# Patient Record
Sex: FEMALE | Race: WHITE | NOT HISPANIC OR LATINO | Employment: OTHER | URBAN - METROPOLITAN AREA
[De-identification: names, ages, dates, MRNs, and addresses within clinical notes are randomized per-mention and may not be internally consistent; named-entity substitution may affect disease eponyms.]

---

## 2017-05-03 ENCOUNTER — ALLSCRIPTS OFFICE VISIT (OUTPATIENT)
Dept: OTHER | Facility: OTHER | Age: 67
End: 2017-05-03

## 2017-07-07 ENCOUNTER — APPOINTMENT (EMERGENCY)
Dept: RADIOLOGY | Facility: HOSPITAL | Age: 67
End: 2017-07-07
Payer: COMMERCIAL

## 2017-07-07 ENCOUNTER — HOSPITAL ENCOUNTER (EMERGENCY)
Facility: HOSPITAL | Age: 67
Discharge: HOME | End: 2017-07-07
Attending: EMERGENCY MEDICINE | Admitting: EMERGENCY MEDICINE
Payer: COMMERCIAL

## 2017-07-07 ENCOUNTER — APPOINTMENT (EMERGENCY)
Dept: CT IMAGING | Facility: HOSPITAL | Age: 67
End: 2017-07-07
Payer: COMMERCIAL

## 2017-07-07 VITALS
SYSTOLIC BLOOD PRESSURE: 134 MMHG | RESPIRATION RATE: 17 BRPM | OXYGEN SATURATION: 97 % | TEMPERATURE: 98.1 F | BODY MASS INDEX: 24.75 KG/M2 | WEIGHT: 145 LBS | HEART RATE: 71 BPM | HEIGHT: 64 IN | DIASTOLIC BLOOD PRESSURE: 82 MMHG

## 2017-07-07 DIAGNOSIS — M54.2 NECK PAIN, ACUTE: ICD-10-CM
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
## 2017-07-07 LAB
ANION GAP SERPL CALCULATED.3IONS-SCNC: 7 MMOL/L (ref 4–13)
BASOPHILS # BLD AUTO: 0.07 THOUSANDS/ΜL (ref 0–0.1)
BASOPHILS NFR BLD AUTO: 1 % (ref 0–1)
BUN SERPL-MCNC: 14 MG/DL (ref 5–25)
CALCIUM SERPL-MCNC: 9.6 MG/DL (ref 8.3–10.1)
CHLORIDE SERPL-SCNC: 106 MMOL/L (ref 100–108)
CO2 SERPL-SCNC: 29 MMOL/L (ref 21–32)
CREAT SERPL-MCNC: 0.77 MG/DL (ref 0.6–1.3)
EOSINOPHIL # BLD AUTO: 0.28 THOUSAND/ΜL (ref 0–0.61)
EOSINOPHIL NFR BLD AUTO: 4 % (ref 0–6)
ERYTHROCYTE [DISTWIDTH] IN BLOOD BY AUTOMATED COUNT: 12.2 % (ref 11.6–15.1)
GFR SERPL CREATININE-BSD FRML MDRD: >60 ML/MIN/1.73SQ M
GLUCOSE SERPL-MCNC: 102 MG/DL (ref 65–140)
HCT VFR BLD AUTO: 41.2 % (ref 34.8–46.1)
HGB BLD-MCNC: 13.8 G/DL (ref 11.5–15.4)
LYMPHOCYTES # BLD AUTO: 1.74 THOUSANDS/ΜL (ref 0.6–4.47)
LYMPHOCYTES NFR BLD AUTO: 25 % (ref 14–44)
MCH RBC QN AUTO: 29.7 PG (ref 26.8–34.3)
MCHC RBC AUTO-ENTMCNC: 33.5 G/DL (ref 31.4–37.4)
MCV RBC AUTO: 89 FL (ref 82–98)
MONOCYTES # BLD AUTO: 0.53 THOUSAND/ΜL (ref 0.17–1.22)
MONOCYTES NFR BLD AUTO: 8 % (ref 4–12)
NEUTROPHILS # BLD AUTO: 4.26 THOUSANDS/ΜL (ref 1.85–7.62)
NEUTS SEG NFR BLD AUTO: 62 % (ref 43–75)
NRBC BLD AUTO-RTO: 0 /100 WBCS
PLATELET # BLD AUTO: 160 THOUSANDS/UL (ref 149–390)
PMV BLD AUTO: 12 FL (ref 8.9–12.7)
POTASSIUM SERPL-SCNC: 4.1 MMOL/L (ref 3.5–5.3)
RBC # BLD AUTO: 4.65 MILLION/UL (ref 3.81–5.12)
SODIUM SERPL-SCNC: 142 MMOL/L (ref 136–145)
WBC # BLD AUTO: 6.89 THOUSAND/UL (ref 4.31–10.16)

## 2017-07-07 PROCEDURE — 80048 BASIC METABOLIC PNL TOTAL CA: CPT | Performed by: EMERGENCY MEDICINE

## 2017-07-07 PROCEDURE — 73030 X-RAY EXAM OF SHOULDER: CPT

## 2017-07-07 PROCEDURE — 72125 CT NECK SPINE W/O DYE: CPT

## 2017-07-07 PROCEDURE — 36415 COLL VENOUS BLD VENIPUNCTURE: CPT | Performed by: EMERGENCY MEDICINE

## 2017-07-07 PROCEDURE — 99284 EMERGENCY DEPT VISIT MOD MDM: CPT

## 2017-07-07 PROCEDURE — 85025 COMPLETE CBC W/AUTO DIFF WBC: CPT | Performed by: EMERGENCY MEDICINE

## 2017-07-07 RX ORDER — LOSARTAN POTASSIUM 50 MG/1
TABLET ORAL
COMMUNITY
Start: 2016-12-19

## 2017-07-07 RX ORDER — ASPIRIN 81 MG/1
81 TABLET ORAL
COMMUNITY
Start: 2015-09-01

## 2017-07-07 RX ORDER — METHOCARBAMOL 500 MG/1
500 TABLET, FILM COATED ORAL 4 TIMES DAILY PRN
Qty: 20 TABLET | Refills: 0 | Status: SHIPPED | OUTPATIENT
Start: 2017-07-07 | End: 2019-05-07

## 2017-07-07 RX ORDER — CLONAZEPAM 1 MG/1
TABLET ORAL
COMMUNITY
Start: 2017-07-03

## 2017-07-07 RX ORDER — OMEPRAZOLE 20 MG/1
CAPSULE, DELAYED RELEASE ORAL
COMMUNITY
Start: 2016-09-13

## 2017-07-07 RX ORDER — ATORVASTATIN CALCIUM 40 MG/1
TABLET, FILM COATED ORAL
COMMUNITY
Start: 2016-11-18

## 2017-07-07 RX ORDER — NAPROXEN 500 MG/1
500 TABLET ORAL 2 TIMES DAILY WITH MEALS
Qty: 20 TABLET | Refills: 0 | Status: SHIPPED | OUTPATIENT
Start: 2017-07-07 | End: 2018-07-07

## 2017-07-07 RX ORDER — IBUPROFEN 400 MG/1
400 TABLET ORAL ONCE
Status: DISCONTINUED | OUTPATIENT
Start: 2017-07-07 | End: 2017-07-07 | Stop reason: HOSPADM

## 2017-07-07 RX ORDER — BUPROPION HYDROCHLORIDE 100 MG/1
TABLET ORAL
COMMUNITY
Start: 2016-06-13

## 2017-09-12 ENCOUNTER — APPOINTMENT (OUTPATIENT)
Dept: LAB | Facility: CLINIC | Age: 67
End: 2017-09-12
Payer: COMMERCIAL

## 2017-09-12 ENCOUNTER — ALLSCRIPTS OFFICE VISIT (OUTPATIENT)
Dept: OTHER | Facility: OTHER | Age: 67
End: 2017-09-12

## 2017-09-12 DIAGNOSIS — R21 RASH AND OTHER NONSPECIFIC SKIN ERUPTION: ICD-10-CM

## 2017-09-12 PROCEDURE — 86617 LYME DISEASE ANTIBODY: CPT

## 2017-09-12 PROCEDURE — 86618 LYME DISEASE ANTIBODY: CPT

## 2017-09-12 PROCEDURE — 36415 COLL VENOUS BLD VENIPUNCTURE: CPT

## 2017-09-13 LAB
B BURGDOR IGG SER IA-ACNC: 0.53
B BURGDOR IGM SER IA-ACNC: 16.1

## 2017-09-15 ENCOUNTER — GENERIC CONVERSION - ENCOUNTER (OUTPATIENT)
Dept: OTHER | Facility: OTHER | Age: 67
End: 2017-09-15

## 2017-09-15 LAB
B BURGDOR IGG PATRN SER IB-IMP: POSITIVE
B BURGDOR IGM PATRN SER IB-IMP: POSITIVE
B BURGDOR18KD IGG SER QL IB: PRESENT
B BURGDOR23KD IGG SER QL IB: PRESENT
B BURGDOR23KD IGM SER QL IB: PRESENT
B BURGDOR28KD IGG SER QL IB: ABNORMAL
B BURGDOR30KD IGG SER QL IB: ABNORMAL
B BURGDOR39KD IGG SER QL IB: PRESENT
B BURGDOR39KD IGM SER QL IB: PRESENT
B BURGDOR41KD IGG SER QL IB: PRESENT
B BURGDOR41KD IGM SER QL IB: PRESENT
B BURGDOR45KD IGG SER QL IB: PRESENT
B BURGDOR58KD IGG SER QL IB: ABNORMAL
B BURGDOR66KD IGG SER QL IB: ABNORMAL
B BURGDOR93KD IGG SER QL IB: ABNORMAL

## 2017-09-19 ENCOUNTER — GENERIC CONVERSION - ENCOUNTER (OUTPATIENT)
Dept: OTHER | Facility: OTHER | Age: 67
End: 2017-09-19

## 2018-01-15 NOTE — RESULT NOTES
Verified Results  (1) LYME ANTIBODY PROFILE [facility] TO WESTERN BLOT [id number] 09:12AM M[facility] Order Number: [id number]     Test Name Result Flag Reference   LYME IGG 0 53  0 00-0 79   NEGATIVE(0 00-0 79)-Absence of detectable Borrelia IgG Antibodies  A negative result does not exclude the possibility of Borrelia infection  If early Lyme disease is suspected,a second sample should be collected & tested 4 weeks after initial testing  LYME IGM 16 10 H 0 00-0 79   POSITIVE (> or = 1 20) - Presence of Borrelia IgM Antibodies  Current testing guidelines recommend that all positive samples be supplemented by further testing  Sample forwarded to reference lab for western blot assay

## 2018-04-13 ENCOUNTER — OFFICE VISIT (OUTPATIENT)
Dept: DERMATOLOGY | Facility: CLINIC | Age: 68
End: 2018-04-13
Payer: COMMERCIAL

## 2018-04-13 DIAGNOSIS — K13.0 ANGULAR CHEILITIS: ICD-10-CM

## 2018-04-13 DIAGNOSIS — Z13.89 SCREENING FOR SKIN CONDITION: Primary | ICD-10-CM

## 2018-04-13 PROCEDURE — 99213 OFFICE O/P EST LOW 20 MIN: CPT | Performed by: DERMATOLOGY

## 2018-04-13 NOTE — PATIENT INSTRUCTIONS
We again discussed this again discussed the concept of this process advised not to use the topical cortisone  Only when things are active also  Will see if she can use [employer] [place] toothpaste to see if this will help some of the irritation    Question of also this being a psoriasiform process was also considered nothing else of concern noted on complete exam follow-up in 1 year

## 2018-04-13 NOTE — PROGRESS NOTES
[address] [facility] OF [address] [facility] DERMATOLOGY  [address] [place] [address]     MRN: [id number] : 1950  Encounter: [id number]  Patient Information: Christi Ngo  Chief complaint:Angular cheilitis and overall checkup    History of present illness:  50-year-old female with previously noted angular cheilitis was doing fairly well until recently on developed more irritation on the left side of the lip was seen by her primary care physician given econazole without any real change and went back to the desonide ointment we had given her previously  Patient notes that she has been using the desonide pretty consistently because the area of irritation of her appears to completely resolve  Past Medical History:   Diagnosis Date    Hyperlipidemia     Hypertension     Psychiatric disorder      No past surgical history on file  Social History   History   Alcohol Use No     History   Drug Use No     History   Smoking Status    Never Smoker   Smokeless Tobacco    Never Used     No family history on file  Meds/Allergies   Allergies   Allergen Reactions    Lisinopril      Other reaction(s): Cough    Penicillin G      Other reaction(s): Other (Please comment)  Pt was young when she had reaction, does not recall what type of reaction she had       Meds:  Prior to Admission medications    Medication Sig Start Date End Date Taking? Authorizing Provider   aspirin (ECOTRIN LOW STRENGTH) 81 mg EC tablet Take 81 mg by mouth 9/1/15  Yes Historical Provider, MD   atorvastatin (LIPITOR) 40 mg tablet ONE PILL BY MOUTH ONCE A DAY  PLEASE CALL FOR OFFICE VISIT   16  Yes Historical Provider, MD   buPROPion (WELLBUTRIN) 100 mg tablet TAKE 1 TAB BY MOUTH 2 TIMES A DAY  16  Yes Historical Provider, MD   clonazePAM (KlonoPIN) 1 mg tablet One pill by mouth at bedtime as needed 7/3/17  Yes Historical Provider, MD   losartan (COZAAR) 50 mg tablet TAKE 1 TABLET BY MOUTH DAILY 12/19/16  Yes Historical Provider, MD   naproxen (NAPROSYN) 500 mg tablet Take 1 tablet by mouth 2 (two) times a day with meals 7/7/17 7/7/18 Yes Angelica Blanc MD   omeprazole (PriLOSEC) 20 mg delayed release capsule  9/13/16  Yes Historical Provider, MD   methocarbamol (ROBAXIN) 500 mg tablet Take 1 tablet by mouth 4 (four) times a day as needed for muscle spasms for up to 10 days 7/7/17 7/17/17  Angelica Blanc MD       Subjective:     Review of Systems:    General: negative for - chills, fatigue, fever,  weight gain or weight loss  Psychological: negative for - anxiety, behavioral disorder, concentration difficulties, decreased libido, depression, irritability, memory difficulties, mood swings, sleep disturbances or suicidal ideation  ENT: negative for - hearing difficulties , nasal congestion, nasal discharge, oral lesions, sinus pain, sneezing, sore throat  Allergy and Immunology: negative for - hives, insect bite sensitivity,  Hematological and Lymphatic: negative for - bleeding problems, blood clots,bruising, swollen lymph nodes  Endocrine: negative for - hair pattern changes, hot flashes, malaise/lethargy, mood swings, palpitations, polydipsia/polyuria, skin changes, temperature intolerance or unexpected weight change  Respiratory: negative for - cough, hemoptysis, orthopnea, shortness of breath, or wheezing  Cardiovascular: negative for - chest pain, dyspnea on exertion, edema,  Gastrointestinal: negative for - abdominal pain, nausea/vomiting  Genito-Urinary: negative for - dysuria, incontinence, irregular/heavy menses or urinary frequency/urgency  Musculoskeletal: negative for - gait disturbance, joint pain, joint stiffness, joint swelling, muscle pain, muscular weakness  Dermatological:  As in HPI  Neurological: negative for confusion, dizziness, headaches, impaired coordination/balance, memory loss, numbness/tingling, seizures, speech problems, tremors or weakness       Objective:    There were no vitals taken for this visit  Physical Exam:    General Appearance:    Alert, cooperative, no distress   Head:    Normocephalic, without obvious abnormality, atraumatic           Skin:   A full skin exam was performed including scalp, head scalp, eyes, ears, nose, lips, neck, chest, axilla, abdomen, back, buttocks, bilateral upper extremities, bilateral lower extremities, hands, feet, fingers, toes, fingernails, and toenails  Right side of her lip appears to be clear left side this scaling erythema noted KOH prep was performed and negative nothing else of concern noted on complete exam except light scaling erythema noted on the elbow     Assessment:     1  Screening for skin condition     2  Angular cheilitis           Plan:   We again discussed this again discussed the concept of this process advised not to use the topical cortisone  Only when things are active also  Will see if she can use [employer] [place] toothpaste to see if this will help some of the irritation  Question of also this being a psoriasiform process was also considered nothing else of concern noted on complete exam follow-up in 1 year    Digna Mckeon MD  4/13/2018,9:32 AM    Portions of the record may have been created with voice recognition software   Occasional wrong word or "sound a like" substitutions may have occurred due to the inherent limitations of voice recognition software   Read the chart carefully and recognize, using context, where substitutions have occurred

## 2019-05-07 ENCOUNTER — OFFICE VISIT (OUTPATIENT)
Dept: DERMATOLOGY | Facility: CLINIC | Age: 69
End: 2019-05-07
Payer: COMMERCIAL

## 2019-05-07 DIAGNOSIS — L82.1 SEBORRHEIC KERATOSIS: Primary | ICD-10-CM

## 2019-05-07 DIAGNOSIS — Z13.89 SCREENING FOR SKIN CONDITION: ICD-10-CM

## 2019-05-07 PROCEDURE — 99213 OFFICE O/P EST LOW 20 MIN: CPT | Performed by: DERMATOLOGY

## 2019-05-07 RX ORDER — AMLODIPINE BESYLATE 5 MG/1
TABLET ORAL
COMMUNITY
Start: 2019-04-08

## 2019-05-07 RX ORDER — FLUTICASONE PROPIONATE 50 MCG
SPRAY, SUSPENSION (ML) NASAL
COMMUNITY
Start: 2018-04-03

## 2019-05-07 RX ORDER — FLUCONAZOLE 200 MG/1
TABLET ORAL
COMMUNITY

## 2019-05-07 RX ORDER — DESONIDE 0.5 MG/G
OINTMENT TOPICAL
COMMUNITY
Start: 2017-05-03 | End: 2022-05-13 | Stop reason: SDUPTHER

## 2019-10-25 ENCOUNTER — TRANSCRIBE ORDERS (OUTPATIENT)
Dept: ADMINISTRATIVE | Facility: HOSPITAL | Age: 69
End: 2019-10-25

## 2019-10-25 DIAGNOSIS — E83.52 HYPERCALCEMIA: Primary | ICD-10-CM

## 2020-05-12 ENCOUNTER — TELEMEDICINE (OUTPATIENT)
Dept: DERMATOLOGY | Facility: CLINIC | Age: 70
End: 2020-05-12
Payer: COMMERCIAL

## 2020-05-12 DIAGNOSIS — L82.1 SEBORRHEIC KERATOSIS: Primary | ICD-10-CM

## 2020-05-12 DIAGNOSIS — Z13.89 SCREENING FOR SKIN CONDITION: ICD-10-CM

## 2020-05-12 PROCEDURE — 99212 OFFICE O/P EST SF 10 MIN: CPT | Performed by: DERMATOLOGY

## 2021-03-05 DIAGNOSIS — Z23 ENCOUNTER FOR IMMUNIZATION: ICD-10-CM

## 2021-08-19 ENCOUNTER — OFFICE VISIT (OUTPATIENT)
Dept: DERMATOLOGY | Facility: CLINIC | Age: 71
End: 2021-08-19
Payer: COMMERCIAL

## 2021-08-19 DIAGNOSIS — L82.1 SEBORRHEIC KERATOSIS: ICD-10-CM

## 2021-08-19 DIAGNOSIS — L25.9 CONTACT DERMATITIS, UNSPECIFIED CONTACT DERMATITIS TYPE, UNSPECIFIED TRIGGER: Primary | ICD-10-CM

## 2021-08-19 DIAGNOSIS — Z13.89 SCREENING FOR SKIN CONDITION: ICD-10-CM

## 2021-08-19 PROCEDURE — 99213 OFFICE O/P EST LOW 20 MIN: CPT | Performed by: DERMATOLOGY

## 2021-08-19 RX ORDER — SERTRALINE HYDROCHLORIDE 25 MG/1
TABLET, FILM COATED ORAL
COMMUNITY
Start: 2021-04-29

## 2021-08-19 RX ORDER — FLUTICASONE PROPIONATE 0.05 %
CREAM (GRAM) TOPICAL DAILY
Qty: 30 G | Refills: 1 | Status: SHIPPED | OUTPATIENT
Start: 2021-08-19

## 2021-08-19 NOTE — PROGRESS NOTES
[address]  [address] [facility] [address]  [phone]  [phone]     MRN: [id number] : 1950  Encounter: [id number]  Patient Information: Laure Carrion  Chief complaint:  Concerns regarding poison ivy has not been gone away    History of present illness:  74-year-old female who had not seen for over year presents for overall checkup concerned regarding itchy rash  Patient notes that this is been going on for over 3 weeks patient seem to have what sounds like poison ivy previously and most of the spots have resolved but she still getting new spots of which are itching  Shows be small erythematous papules on her skin also rash on her leg been else of concern noted  Patient reports that she is putting some anti-itch over-the-counter cream on the spots  Past Medical History:   Diagnosis Date    Hyperlipidemia     Hypertension     Psychiatric disorder      History reviewed  No pertinent surgical history  Social History   Social History     Substance and Sexual Activity   Alcohol Use No     Social History     Substance and Sexual Activity   Drug Use No     Social History     Tobacco Use   Smoking Status Never Smoker   Smokeless Tobacco Never Used     History reviewed  No pertinent family history  Meds/Allergies   Allergies   Allergen Reactions    Lisinopril      Other reaction(s): Cough    Penicillins Hives    Penicillin G      Other reaction(s): Other (Please comment)  Pt was young when she had reaction, does not recall what type of reaction she had       Meds:  Prior to Admission medications    Medication Sig Start Date End Date Taking? Authorizing Provider   aspirin (ECOTRIN LOW STRENGTH) 81 mg EC tablet Take 81 mg by mouth 9/1/15  Yes Historical Provider, MD   atorvastatin (LIPITOR) 40 mg tablet ONE PILL BY MOUTH ONCE A DAY  PLEASE CALL FOR OFFICE VISIT   16  Yes Historical Provider, MD   buPROPion (WELLBUTRIN) 100 mg tablet TAKE 1 TAB BY MOUTH 2 TIMES A DAY  6/13/16  Yes Historical Provider, MD   clonazePAM (KlonoPIN) 1 mg tablet One pill by mouth at bedtime as needed 7/3/17  Yes Historical Provider, MD   fluticasone (FLONASE) 50 mcg/act nasal spray fluticasone propionate 50 mcg/actuation nasal spray,suspension 4/3/18  Yes Historical Provider, MD   losartan (COZAAR) 50 mg tablet TAKE 1 TABLET BY MOUTH DAILY 12/19/16  Yes Historical Provider, MD   sertraline (ZOLOFT) 25 mg tablet sertraline 25 mg tablet 4/29/21  Yes Historical Provider, MD   Sod Fluoride-Potassium Nitrate (PREVIDENT 5000 SENSITIVE) 1 1-5 % PSTE PreviDent 5000 Sensitive 1 1 %-5 % dental paste 6/9/15  Yes Historical Provider, MD   amLODIPine (NORVASC) 5 mg tablet amlodipine 5 mg tablet  Patient not taking: Reported on 8/19/2021 4/8/19   Historical Provider, MD   Cholecalciferol (D 1000) 1000 units CHEW Chew  Patient not taking: Reported on 8/19/2021    Historical Provider, MD   conjugated estrogens (PREMARIN) vaginal cream Premarin 0 625 mg/gram vaginal cream 9/26/18   Historical Provider, MD   desonide (DESOWEN) 0 05 % ointment desonide 0 05 % topical ointment  Patient not taking: Reported on 8/19/2021 5/3/17   Historical Provider, MD   econazole nitrate 1 % cream econazole 1 % topical cream  Patient not taking: Reported on 8/19/2021    Historical Provider, MD   fluconazole (DIFLUCAN) 200 mg tablet fluconazole 200 mg tablet  Patient not taking: Reported on 8/19/2021    Historical Provider, MD   methocarbamol (ROBAXIN) 500 mg tablet Take 1 tablet by mouth 4 (four) times a day as needed for muscle spasms for up to 10 days  Patient not taking: Reported on 5/7/2019 7/7/17 5/7/19  Nilda Pool MD   naproxen (NAPROSYN) 500 mg tablet Take 1 tablet by mouth 2 (two) times a day with meals  Patient not taking: Reported on 5/7/2019 7/7/17 7/7/18  Nilda Pool MD   omeprazole (PriLOSEC) 20 mg delayed release capsule  9/13/16   Historical Provider, MD       Subjective: Review of Systems:    General: negative for - chills, fatigue, fever,  weight gain or weight loss  Psychological: negative for - anxiety, behavioral disorder, concentration difficulties, decreased libido, depression, irritability, memory difficulties, mood swings, sleep disturbances or suicidal ideation  ENT: negative for - hearing difficulties , nasal congestion, nasal discharge, oral lesions, sinus pain, sneezing, sore throat  Allergy and Immunology: negative for - hives, insect bite sensitivity,  Hematological and Lymphatic: negative for - bleeding problems, blood clots,bruising, swollen lymph nodes  Endocrine: negative for - hair pattern changes, hot flashes, malaise/lethargy, mood swings, palpitations, polydipsia/polyuria, skin changes, temperature intolerance or unexpected weight change  Respiratory: negative for - cough, hemoptysis, orthopnea, shortness of breath, or wheezing  Cardiovascular: negative for - chest pain, dyspnea on exertion, edema,  Gastrointestinal: negative for - abdominal pain, nausea/vomiting  Genito-Urinary: negative for - dysuria, incontinence, irregular/heavy menses or urinary frequency/urgency  Musculoskeletal: negative for - gait disturbance, joint pain, joint stiffness, joint swelling, muscle pain, muscular weakness  Dermatological:  As in HPI  Neurological: negative for confusion, dizziness, headaches, impaired coordination/balance, memory loss, numbness/tingling, seizures, speech problems, tremors or weakness       Objective: There were no vitals taken for this visit      Physical Exam:    General Appearance:    Alert, cooperative, no distress   Head:    Normocephalic, without obvious abnormality, atraumatic           Skin:   A full skin exam was performed including scalp, head scalp, eyes, ears, nose, lips, neck, chest, axilla, abdomen, back, buttocks, bilateral upper extremities, bilateral lower extremities, hands, feet, fingers, toes, fingernails, and toenails excoriated you will papule noted between the webspace on the 3rd 4th finger left hand  Well-demarcated as juicy looking patch on the right ankle occasional erythematous papules noted scattered elsewhere nothing else remarkable on exam normal keratotic papules with greasy stuck on appearance     Assessment:     1  Contact dermatitis, unspecified contact dermatitis type, unspecified trigger  fluticasone (CUTIVATE) 0 05 % cream   2  Seborrheic keratosis     3  Screening for skin condition           Plan:   Appears to be residual contact reaction question of this is still persistent from her previous poison ivy versus a contact reaction to something she has been putting on her skin on will give her topical steroid to see if this will resolve if not to let us    awsen  Screening for Dermatologic Disorders: Nothing else of concern noted on complete exam follow up in 1 year       Shanae Johnson MD  8/19/2021,3:10 PM    Portions of the record may have been created with voice recognition software   Occasional wrong word or "sound a like" substitutions may have occurred due to the inherent limitations of voice recognition software   Read the chart carefully and recognize, using context, where substitutions have occurred

## 2021-08-19 NOTE — PATIENT INSTRUCTIONS
Appears to be residual contact reaction question of this is still persistent from her previous poison ivy versus a contact reaction to something she has been putting on her skin on will give her topical steroid to see if this will resolve if not to let us    awsen  Screening for Dermatologic Disorders: Nothing else of concern noted on complete exam follow up in 1 year

## 2021-11-26 ENCOUNTER — TELEPHONE (OUTPATIENT)
Dept: GASTROENTEROLOGY | Facility: CLINIC | Age: 71
End: 2021-11-26

## 2022-05-13 DIAGNOSIS — L25.9 CONTACT DERMATITIS, UNSPECIFIED CONTACT DERMATITIS TYPE, UNSPECIFIED TRIGGER: Primary | ICD-10-CM

## 2022-05-13 RX ORDER — DESONIDE 0.5 MG/G
OINTMENT TOPICAL
Qty: 15 G | Refills: 1 | Status: SHIPPED | OUTPATIENT
Start: 2022-05-13

## 2022-07-13 ENCOUNTER — TELEPHONE (OUTPATIENT)
Dept: DERMATOLOGY | Facility: CLINIC | Age: 72
End: 2022-07-13

## 2022-07-15 NOTE — TELEPHONE ENCOUNTER
Yes  Would need an appointment to be re-evaluated  Thanks! She can also send a pic in her my chart and I can take a look that way as well

## 2022-08-22 ENCOUNTER — NURSE TRIAGE (OUTPATIENT)
Dept: OTHER | Facility: OTHER | Age: 72
End: 2022-08-22

## 2022-08-22 NOTE — TELEPHONE ENCOUNTER
Regarding: sick appointment   ----- Message from Maynor Álvarez sent at 8/22/2022  5:38 PM EDT -----  "I am having a rash on my face and now its on my mouth its healing and its recurring and I am looking for a sick appointment

## 2022-08-22 NOTE — TELEPHONE ENCOUNTER
Patient stated that she cancelled last appt because rash went away but now it is back  Patient wanted to know if she could be seen on 8/23 for the rash since it comes and goes and has been bothering her for the past 3 months  Patient has been trying OTC creams that have been making it worse

## 2022-08-22 NOTE — TELEPHONE ENCOUNTER
Reason for Disposition   [1] Applying cream or ointment AND [2] causes severe itch, burning or pain    Answer Assessment - Initial Assessment Questions  1  APPEARANCE of RASH: "Describe the rash "       Angular chellitis in the past; had a flare of that and isn't sure if it is related to that  Small pimple like bumps in a red area  2  LOCATION: "Where is the rash located?"       Face and mouth  3  NUMBER: "How many spots are there?"       Numerous  4  SIZE: "How big are the spots?" (Inches, centimeters or compare to size of a coin)      Tiny   5  ONSET: "When did the rash start?"       On and off for 3 months  6  ITCHING: "Does the rash itch?" If Yes, ask: "How bad is the itch?"  (Scale 1-10; or mild, moderate, severe)      itching  7  PAIN: "Does the rash hurt?" If Yes, ask: "How bad is the pain?"  (Scale 1-10; or mild, moderate, severe)      Sore, mild pain (lips are cracked)  8   OTHER SYMPTOMS: "Do you have any other symptoms?" (e g , fever)      Denies other symptoms    Protocols used: RASH OR REDNESS - LOCALIZED-ADULT-

## 2022-09-01 ENCOUNTER — OFFICE VISIT (OUTPATIENT)
Dept: DERMATOLOGY | Facility: CLINIC | Age: 72
End: 2022-09-01
Payer: COMMERCIAL

## 2022-09-01 VITALS — HEIGHT: 63 IN | BODY MASS INDEX: 23.92 KG/M2 | WEIGHT: 135 LBS

## 2022-09-01 DIAGNOSIS — L71.0 PERIORAL DERMATITIS: Primary | ICD-10-CM

## 2022-09-01 DIAGNOSIS — Z13.89 SCREENING FOR SKIN CONDITION: ICD-10-CM

## 2022-09-01 DIAGNOSIS — L82.1 SEBORRHEIC KERATOSIS: ICD-10-CM

## 2022-09-01 DIAGNOSIS — L28.0 LICHEN SIMPLEX CHRONICUS: ICD-10-CM

## 2022-09-01 PROCEDURE — 99213 OFFICE O/P EST LOW 20 MIN: CPT | Performed by: DERMATOLOGY

## 2022-09-01 RX ORDER — DOXYCYCLINE HYCLATE 50 MG/1
50 CAPSULE ORAL 2 TIMES DAILY
Qty: 60 CAPSULE | Refills: 1 | Status: SHIPPED | OUTPATIENT
Start: 2022-09-01 | End: 2022-10-01

## 2022-09-01 RX ORDER — PIMECROLIMUS 10 MG/G
CREAM TOPICAL 2 TIMES DAILY
Qty: 30 G | Refills: 1 | Status: SHIPPED | OUTPATIENT
Start: 2022-09-01

## 2022-09-01 NOTE — PATIENT INSTRUCTIONS
PERIORIFICIAL DERMATITIS      What is periorificial dermatitis? Periorificial dermatitis is a common facial skin problem characterized by groups of itchy or tender small red bumps  It is given this name because the bumps occur around the eyes, the nostrils, the mouth and occasionally, the genitals  The more restrictive term, perioral dermatitis, is often used when the eruption is confined to the skin in the lower half of the face, particularly around the mouth  Periocular dermatitis may be used to describe the rash affecting the eyelids  Periorificial dermatitis mainly affects adult women aged 13 to 39 years  It is less common in men  It can also affect children of any age  What is the cause of periorificial or periorificial dermatitis? The exact cause of periorificial is not understood  Periorificial dermatitis may be related to:  Skin barrier dysfunction  Activation of the body's immune system  Altered bacterial levels on the skin  Bacteria from hair follicles    Unlike seborrheic dermatitis which can affect similar areas of the face, malassezia yeasts are not involved in periorificial dermatitis  Periorificial dermatitis may be directly caused by: Topical steroids, whether applied deliberately to facial skin or inadvertently  Nasal steroids, steroid inhalers, and oral steroids  Cosmetic creams, make-ups and sunscreens  Fluorinated toothpaste  Neglecting to wash the face  Hormonal changes and/or oral contraceptives    What are the symptoms of periorificial dermatitis? The characteristics of facial periorificial dermatitis are:  Eruption on the chin, upper lip and eyelids   Sparing of the skin bordering the lips (which then appears pale), eyelids, nostrils  Clusters of 1-2 mm red bumps, potentially with pus  Dry and flaky skin surface  Burning irritation    In contrast to steroid-induced rosacea, periorificial dermatitis spares the cheeks and forehead    Genital periorificial dermatitis has a similar clinical appearance  It involves the skin on and around labia majora (in females), scrotum (in males) and anus  Granulomatous periorificial dermatitis is a variant of periorificial dermatitis that presents with persistent yellowish bumps  It occurs mainly in young children and nearly always follows the use of a corticosteroid  Rebound flare of severe periorificial dermatitis may occur after abrupt cessation of application of potent topical steroid to facial skin  The presentation of periorificial dermatitis is usually typical, so diagnosis can be made by history and skin exam  There are no specific tests  Skin biopsy may occasionally be taken, and show similar findings to rosacea  Periorificial dermatitis responds well to treatment, although it may take several weeks before there is a noticeable improvement  General measures  Discontinue applying all face creams including topical steroids, cosmetics and sunscreens (zero therapy)  Consider a slower withdrawal from topical steroid/face creams if there is a severe flare after steroid cessation  Temporarily, replace it by a less potent or less occlusive cream or apply it less and less frequently until it is no longer required  Wash the face with warm water alone while the rash is present  When it has cleared up, use a non-soap bar or liquid cleanser if you wish  Choose a liquid or gel sunscreen  Topical therapy: used to treat mild periorificial dermatitis  Choices include:  Erythromycin  Clindamycin  Metronidazole  Pimecrolimus  Azelaic acid    Oral therapy: in more severe cases, a course of oral antibiotics may be prescribed for 6-12 weeks  Most often, a tetracycline such as doxycycline is recommended  A sub-antimicrobial dose may be sufficient  Oral erythromycin is used during pregnancy and in pre-pubertal children  Oral low-dose isotretinoin may be used if antibiotics are ineffective or contraindicated      Periorificial dermatitis can generally be prevented by the avoidance of topical steroids and occlusive face creams  When topical steroids are necessary to treat an inflammatory facial rash, they should be applied accurately to the affected area, no more than once daily in the lowest effective potency, and discontinued as soon as the rash responds  Periorificial dermatitis sometimes recurs when the antibiotics are discontinued, or at a later date  The same treatment can be used again  Lichen simplex chronicus patient probably get irritated this for rubbing it on occasion on the leg advised to try to keep her hands off of it this see if it will settle down   Seborrheic Keratosis  Patient reasurred these are normal growths we acquire with age no treatment needed    Screening for Dermatologic Disorders: Nothing else of concern noted on complete exam follow up in 1 year

## 2022-10-17 ENCOUNTER — TELEPHONE (OUTPATIENT)
Dept: DERMATOLOGY | Facility: CLINIC | Age: 72
End: 2022-10-17

## 2022-10-17 ENCOUNTER — OFFICE VISIT (OUTPATIENT)
Dept: DERMATOLOGY | Facility: CLINIC | Age: 72
End: 2022-10-17
Payer: COMMERCIAL

## 2022-10-17 VITALS — HEIGHT: 63 IN | WEIGHT: 135 LBS | BODY MASS INDEX: 23.92 KG/M2

## 2022-10-17 DIAGNOSIS — K13.0 ANGULAR CHEILITIS: Primary | ICD-10-CM

## 2022-10-17 PROCEDURE — 99213 OFFICE O/P EST LOW 20 MIN: CPT | Performed by: DERMATOLOGY

## 2022-10-17 NOTE — PATIENT INSTRUCTIONS
At present the perioral dermatitis appears to be resolved patient does not need to be on doxycycline at this time suggested because the inflammation still present on the corners of the mouth to use the Elidel cream on a more continuous basis to see if this will keep this under control

## 2022-10-17 NOTE — TELEPHONE ENCOUNTER
Pt was seen in September for her Angular Chelitis  She has been on Doxy twice a day for the last 6 weeks  She was instructed to decrease tab to once a day but her condition flares up when she tries  She is requesting a rx for Nystatin Cream for her rash since it has worked in the past  She also wants to know if she needs a new refill for the doxycycline

## 2022-10-17 NOTE — PROGRESS NOTES
[address] [facility] DERMATOLOGY  [address]  [address] [place] [address]  [phone]  [phone]     MRN: [id number] : 1950  Encounter: [id number]  Patient Information: January Mulligan  Chief complaint: irritation on corners of mouth    History of present illness:  1year-old female seen in follow-up secondary to the angular cheilitis that we had noted previously patient also with a perioral dermatitis that we treated with doxycycline which seems to have resolved continues to have irritation in the corners of the lips and patient requested nystatin for this  Past Medical History:   Diagnosis Date   • Ear problems    • Hyperlipidemia    • Hypertension    • Psychiatric disorder      Past Surgical History:   Procedure Laterality Date   • SINUS SURGERY       Social History   Social History     Substance and Sexual Activity   Alcohol Use No     Social History     Substance and Sexual Activity   Drug Use No     Social History     Tobacco Use   Smoking Status Never Smoker   Smokeless Tobacco Never Used     No family history on file  Meds/Allergies   Allergies   Allergen Reactions   • Lisinopril      Other reaction(s): Cough   • Penicillins Hives   • Penicillin G      Other reaction(s): Other (Please comment)  Pt was young when she had reaction, does not recall what type of reaction she had       Meds:  Prior to Admission medications    Medication Sig Start Date End Date Taking? Authorizing Provider   amLODIPine (NORVASC) 5 mg tablet amlodipine 5 mg tablet  Patient not taking: No sig reported 19   Historical Provider, MD   aspirin (ECOTRIN LOW STRENGTH) 81 mg EC tablet Take 81 mg by mouth 9/1/15   Historical Provider, MD   atorvastatin (LIPITOR) 40 mg tablet ONE PILL BY MOUTH ONCE A DAY  PLEASE CALL FOR OFFICE VISIT   16   Historical Provider, MD   buPROPion (WELLBUTRIN) 100 mg tablet TAKE 1 TAB BY MOUTH 2 TIMES A DAY  16   Historical Provider, MD   Cholecalciferol (D 1000) 1000 units CHEW Chew  Patient not taking: Reported on 8/19/2021    Historical Provider, MD   clonazePAM (KlonoPIN) 1 mg tablet One pill by mouth at bedtime as needed 7/3/17   Historical Provider, MD   conjugated estrogens (PREMARIN) vaginal cream Premarin 0 625 mg/gram vaginal cream 9/26/18   Historical Provider, MD   desonide (DESOWEN) 0 05 % ointment APPLY SPARINGLY TO AFFECTED AREA(S) TWICE DAILY 5/13/22   Beto Anthony MD   econazole nitrate 1 % cream econazole 1 % topical cream    Historical Provider, MD   estradiol (ESTRACE) 0 1 mg/g vaginal cream As needed     Historical Provider, MD   fluconazole (DIFLUCAN) 200 mg tablet fluconazole 200 mg tablet    Historical Provider, MD   fluticasone (CUTIVATE) 0 05 % cream Apply topically daily Rash till resolved 8/19/21   Beto Anthony MD   fluticasone Brooklynn Abel) 50 mcg/act nasal spray fluticasone propionate 50 mcg/actuation nasal spray,suspension 4/3/18   Historical Provider, MD   ibuprofen (MOTRIN) 600 mg tablet  4/6/22   Historical Provider, MD   losartan (COZAAR) 50 mg tablet TAKE 1 TABLET BY MOUTH DAILY 12/19/16   Historical Provider, MD   methocarbamol (ROBAXIN) 500 mg tablet Take 1 tablet by mouth 4 (four) times a day as needed for muscle spasms for up to 10 days  Patient not taking: Reported on 5/7/2019 7/7/17 5/7/19  nAa Redd MD   naproxen (NAPROSYN) 500 mg tablet Take 1 tablet by mouth 2 (two) times a day with meals  Patient not taking: Reported on 5/7/2019 7/7/17 7/7/18  Ana Redd MD   omeprazole (PriLOSEC) 20 mg delayed release capsule  9/13/16   Historical Provider, MD   pimecrolimus (ELIDEL) 1 % cream Apply topically 2 (two) times a day To rash around mouth until resolved 9/1/22   Beto Anthony MD   sertraline (ZOLOFT) 25 mg tablet sertraline 25 mg tablet 4/29/21   Historical Provider, MD [place] Fluoride-Potassium Nitrate 1 1-5 % PSTE PreviDent 5000 Sensitive 1 1 %-5 % dental paste 6/9/15   Historical Provider, MD [place] Fluoride-Potassium Nitrate 1 1-5 % PSTE PreviDent 5000 Sensitive 1 1 %-5 % dental paste    Historical Provider, MD       Subjective:     Review of Systems:    General: negative for - chills, fatigue, fever,  weight gain or weight loss  Psychological: negative for - anxiety, behavioral disorder, concentration difficulties, decreased libido, depression, irritability, memory difficulties, mood swings, sleep disturbances or suicidal ideation  ENT: negative for - hearing difficulties , nasal congestion, nasal discharge, oral lesions, sinus pain, sneezing, sore throat  Allergy and Immunology: negative for - hives, insect bite sensitivity,  Hematological and Lymphatic: negative for - bleeding problems, blood clots,bruising, swollen lymph nodes  Endocrine: negative for - hair pattern changes, hot flashes, malaise/lethargy, mood swings, palpitations, polydipsia/polyuria, skin changes, temperature intolerance or unexpected weight change  Respiratory: negative for - cough, hemoptysis, orthopnea, shortness of breath, or wheezing  Cardiovascular: negative for - chest pain, dyspnea on exertion, edema,  Gastrointestinal: negative for - abdominal pain, nausea/vomiting  Genito-Urinary: negative for - dysuria, incontinence, irregular/heavy menses or urinary frequency/urgency  Musculoskeletal: negative for - gait disturbance, joint pain, joint stiffness, joint swelling, muscle pain, muscular weakness  Dermatological:  As in HPI  Neurological: negative for confusion, dizziness, headaches, impaired coordination/balance, memory loss, numbness/tingling, seizures, speech problems, tremors or weakness       Objective:   Ht 5' 3" (1 6 m)   Wt 61 2 kg (135 lb)   BMI 23 91 kg/m²     Physical Exam:    General Appearance:    Alert, cooperative, no distress   Head:    Normocephalic, without obvious abnormality, atraumatic           Skin:   A full skin exam was performed including scalp, head scalp, eyes, ears, nose, lips, neck, chest, axilla, abdomen, back, buttocks, bilateral upper extremities, bilateral lower extremities, hands, feet, fingers, toes, fingernails, and toenails slight erythema scaling noted in the corners of the mouth ALYSHA prep performed and negative     Assessment:     1  Angular cheilitis           Plan: At present the perioral dermatitis appears to be resolved patient does not need to be on doxycycline at this time suggested because the inflammation still present on the corners of the mouth to use the Elidel cream on a more continuous basis to see if this will keep this under control    Glen Barlow  10/17/2022,2:42 PM    Portions of the record may have been created with voice recognition software   Occasional wrong word or "sound a like" substitutions may have occurred due to the inherent limitations of voice recognition software   Read the chart carefully and recognize, using context, where substitutions have occurred

## 2022-10-17 NOTE — TELEPHONE ENCOUNTER
Left voicemail for pt  to make an appt with our office at her earliest convenience for re-evaluation

## 2022-11-09 DIAGNOSIS — L71.0 PERIORAL DERMATITIS: ICD-10-CM

## 2022-11-09 RX ORDER — DOXYCYCLINE HYCLATE 50 MG/1
50 CAPSULE ORAL 2 TIMES DAILY
Qty: 60 CAPSULE | Refills: 1 | Status: SHIPPED | OUTPATIENT
Start: 2022-11-09 | End: 2022-12-09

## 2023-04-27 ENCOUNTER — APPOINTMENT (EMERGENCY)
Dept: RADIOLOGY | Facility: HOSPITAL | Age: 73
End: 2023-04-27

## 2023-04-27 ENCOUNTER — HOSPITAL ENCOUNTER (EMERGENCY)
Facility: HOSPITAL | Age: 73
Discharge: HOME | End: 2023-04-27
Attending: EMERGENCY MEDICINE

## 2023-04-27 VITALS
HEART RATE: 71 BPM | RESPIRATION RATE: 21 BRPM | TEMPERATURE: 98.6 F | SYSTOLIC BLOOD PRESSURE: 165 MMHG | DIASTOLIC BLOOD PRESSURE: 85 MMHG | OXYGEN SATURATION: 98 %

## 2023-04-27 DIAGNOSIS — I10 HIGH BLOOD PRESSURE: Primary | ICD-10-CM

## 2023-04-27 LAB
2HR DELTA HS TROPONIN: 0 NG/L
ALBUMIN SERPL BCP-MCNC: 5.2 G/DL (ref 3.5–5)
ALP SERPL-CCNC: 90 U/L (ref 34–104)
ALT SERPL W P-5'-P-CCNC: 15 U/L (ref 7–52)
ANION GAP SERPL CALCULATED.3IONS-SCNC: 10 MMOL/L (ref 4–13)
AST SERPL W P-5'-P-CCNC: 28 U/L (ref 13–39)
BASOPHILS # BLD AUTO: 0.06 THOUSANDS/ΜL (ref 0–0.1)
BASOPHILS NFR BLD AUTO: 1 % (ref 0–1)
BILIRUB SERPL-MCNC: 1.68 MG/DL (ref 0.2–1)
BUN SERPL-MCNC: 10 MG/DL (ref 5–25)
CALCIUM SERPL-MCNC: 10.1 MG/DL (ref 8.4–10.2)
CARDIAC TROPONIN I PNL SERPL HS: 3 NG/L
CARDIAC TROPONIN I PNL SERPL HS: 3 NG/L
CHLORIDE SERPL-SCNC: 105 MMOL/L (ref 96–108)
CO2 SERPL-SCNC: 23 MMOL/L (ref 21–32)
CREAT SERPL-MCNC: 0.76 MG/DL (ref 0.6–1.3)
D DIMER PPP FEU-MCNC: 0.28 UG/ML FEU
EOSINOPHIL # BLD AUTO: 0.12 THOUSAND/ΜL (ref 0–0.61)
EOSINOPHIL NFR BLD AUTO: 2 % (ref 0–6)
ERYTHROCYTE [DISTWIDTH] IN BLOOD BY AUTOMATED COUNT: 12.2 % (ref 11.6–15.1)
GFR SERPL CREATININE-BSD FRML MDRD: 77 ML/MIN/1.73SQ M
GLUCOSE SERPL-MCNC: 98 MG/DL (ref 65–140)
HCT VFR BLD AUTO: 46.5 % (ref 34.8–46.1)
HGB BLD-MCNC: 15.6 G/DL (ref 11.5–15.4)
IMM GRANULOCYTES # BLD AUTO: 0.01 THOUSAND/UL (ref 0–0.2)
IMM GRANULOCYTES NFR BLD AUTO: 0 % (ref 0–2)
LYMPHOCYTES # BLD AUTO: 2.07 THOUSANDS/ΜL (ref 0.6–4.47)
LYMPHOCYTES NFR BLD AUTO: 29 % (ref 14–44)
MCH RBC QN AUTO: 30.1 PG (ref 26.8–34.3)
MCHC RBC AUTO-ENTMCNC: 33.5 G/DL (ref 31.4–37.4)
MCV RBC AUTO: 90 FL (ref 82–98)
MONOCYTES # BLD AUTO: 0.51 THOUSAND/ΜL (ref 0.17–1.22)
MONOCYTES NFR BLD AUTO: 7 % (ref 4–12)
NEUTROPHILS # BLD AUTO: 4.44 THOUSANDS/ΜL (ref 1.85–7.62)
NEUTS SEG NFR BLD AUTO: 61 % (ref 43–75)
NRBC BLD AUTO-RTO: 0 /100 WBCS
PLATELET # BLD AUTO: 184 THOUSANDS/UL (ref 149–390)
PMV BLD AUTO: 12.4 FL (ref 8.9–12.7)
POTASSIUM SERPL-SCNC: 5 MMOL/L (ref 3.5–5.3)
PROT SERPL-MCNC: 8.2 G/DL (ref 6.4–8.4)
RBC # BLD AUTO: 5.18 MILLION/UL (ref 3.81–5.12)
SODIUM SERPL-SCNC: 138 MMOL/L (ref 135–147)
WBC # BLD AUTO: 7.21 THOUSAND/UL (ref 4.31–10.16)

## 2023-04-27 RX ORDER — ACETAMINOPHEN 325 MG/1
650 TABLET ORAL ONCE
Status: COMPLETED | OUTPATIENT
Start: 2023-04-27 | End: 2023-04-27

## 2023-04-27 RX ORDER — LABETALOL HYDROCHLORIDE 5 MG/ML
10 INJECTION, SOLUTION INTRAVENOUS ONCE
Status: COMPLETED | OUTPATIENT
Start: 2023-04-27 | End: 2023-04-27

## 2023-04-27 RX ORDER — LOSARTAN POTASSIUM 50 MG/1
50 TABLET ORAL ONCE
Status: COMPLETED | OUTPATIENT
Start: 2023-04-27 | End: 2023-04-27

## 2023-04-27 RX ADMIN — ACETAMINOPHEN 650 MG: 325 TABLET ORAL at 18:55

## 2023-04-27 RX ADMIN — SODIUM CHLORIDE 1000 ML: 0.9 INJECTION, SOLUTION INTRAVENOUS at 16:55

## 2023-04-27 RX ADMIN — LABETALOL HYDROCHLORIDE 10 MG: 5 INJECTION, SOLUTION INTRAVENOUS at 17:04

## 2023-04-27 RX ADMIN — LOSARTAN POTASSIUM 50 MG: 50 TABLET, FILM COATED ORAL at 17:05

## 2023-04-27 NOTE — DISCHARGE INSTRUCTIONS
Follow up with PCP  Return to the ED with new or worsening symptoms including but not limited to chest pain, shortness of breath, difficulty breathing, elevated BP, dizziness, headache    Your blood pressure was elevated during your visit today  Please follow up with your PCP

## 2023-04-27 NOTE — ED PROVIDER NOTES
History  Chief Complaint   Patient presents with   • Hypertension     Pt reports elevated blood pressures since last night, systolic >659, left arm numbness started about one hour ago, otherwise neurologically intact, ambulatory to triage     Patient is a 60-year-old female with a past medical history of hyperlipidemia, hypertension, cervical strain, generalized anxiety disorder presenting to the emergency department for evaluation of elevated blood pressure  Patient reports last night she got very worked up like her heart was racing  Patient reports at that time she took her blood pressure and noticed blood pressure was approximately 190/110  Patient reports she does take losartan 50 mg nightly  Patient reports she has not recently missed a dose  Patient reports approximately 1 hour ago she had a cool sensation applied down her left arm that is since dissipated  Patient denied any sensation of numbness or paresthesias in contrast with triage note  Patient reports having full sensation to the arm it was a brief moment of cold than hot down the arm  Patient reports she took her blood pressure today and noticed elevated both systolic and diastolically again around 190/110  Patient reports this is abnormal for her  Patient reports she has had a headache for quite some time secondary to her cervical strain, reports the headache is not new to today  Denies fevers, chills, rash, headache, weakness, dizziness, visual changes, abdominal pain, nausea, vomiting, diarrhea, constipation, chest pain, shortness of breath or difficulty breathing  Does not offer any other concerns or complaints  Prior to Admission Medications   Prescriptions Last Dose Informant Patient Reported? Taking?    Cholecalciferol (D 1000) 1000 units CHEW   Yes No   Sig: Chew   Patient not taking: Reported on 8/19/2021   Sod Fluoride-Potassium Nitrate 1 1-5 % PSTE   Yes No   Sig: PreviDent 5000 Sensitive 1 1 %-5 % dental paste   Sod Fluoride-Potassium Nitrate 1 1-5 % PSTE   Yes No   Sig: PreviDent 5000 Sensitive 1 1 %-5 % dental paste   amLODIPine (NORVASC) 5 mg tablet   Yes No   Sig: amlodipine 5 mg tablet   Patient not taking: No sig reported   aspirin (ECOTRIN LOW STRENGTH) 81 mg EC tablet   Yes No   Sig: Take 81 mg by mouth   atorvastatin (LIPITOR) 40 mg tablet   Yes No   Sig: ONE PILL BY MOUTH ONCE A DAY  PLEASE CALL FOR OFFICE VISIT  buPROPion (WELLBUTRIN) 100 mg tablet   Yes No   Sig: TAKE 1 TAB BY MOUTH 2 TIMES A DAY     clonazePAM (KlonoPIN) 1 mg tablet   Yes No   Sig: One pill by mouth at bedtime as needed   conjugated estrogens (PREMARIN) vaginal cream   Yes No   Sig: Premarin 0 625 mg/gram vaginal cream   desonide (DESOWEN) 0 05 % ointment   No No   Sig: APPLY SPARINGLY TO AFFECTED AREA(S) TWICE DAILY   econazole nitrate 1 % cream   Yes No   Sig: econazole 1 % topical cream   estradiol (ESTRACE) 0 1 mg/g vaginal cream   Yes No   Sig: As needed    fluconazole (DIFLUCAN) 200 mg tablet   Yes No   Sig: fluconazole 200 mg tablet   fluticasone (CUTIVATE) 0 05 % cream   No No   Sig: Apply topically daily Rash till resolved   fluticasone (FLONASE) 50 mcg/act nasal spray   Yes No   Sig: fluticasone propionate 50 mcg/actuation nasal spray,suspension   ibuprofen (MOTRIN) 600 mg tablet   Yes No   losartan (COZAAR) 50 mg tablet   Yes No   Sig: TAKE 1 TABLET BY MOUTH DAILY   methocarbamol (ROBAXIN) 500 mg tablet   No No   Sig: Take 1 tablet by mouth 4 (four) times a day as needed for muscle spasms for up to 10 days   Patient not taking: Reported on 5/7/2019   naproxen (NAPROSYN) 500 mg tablet   No No   Sig: Take 1 tablet by mouth 2 (two) times a day with meals   Patient not taking: Reported on 5/7/2019   omeprazole (PriLOSEC) 20 mg delayed release capsule   Yes No   pimecrolimus (ELIDEL) 1 % cream   No No   Sig: Apply topically 2 (two) times a day To rash around mouth until resolved   sertraline (ZOLOFT) 25 mg tablet   Yes No   Sig: sertraline 25 mg tablet      Facility-Administered Medications: None       Past Medical History:   Diagnosis Date   • Ear problems    • Hyperlipidemia    • Hypertension    • Psychiatric disorder        Past Surgical History:   Procedure Laterality Date   • SINUS SURGERY         History reviewed  No pertinent family history  I have reviewed and agree with the history as documented  E-Cigarette/Vaping     E-Cigarette/Vaping Substances     Social History     Tobacco Use   • Smoking status: Never   • Smokeless tobacco: Never   Substance Use Topics   • Alcohol use: No   • Drug use: No       Review of Systems   Constitutional: Negative for chills and fever  HENT: Negative for ear pain and sore throat  Eyes: Negative for pain and visual disturbance  Respiratory: Negative for cough and shortness of breath  Cardiovascular: Negative for chest pain and palpitations  Elevated BP   Gastrointestinal: Negative for abdominal pain and vomiting  Genitourinary: Negative for dysuria and hematuria  Musculoskeletal: Negative for arthralgias and back pain  Skin: Negative for color change and rash  Neurological: Positive for headaches  Negative for dizziness, seizures, syncope, weakness, light-headedness and numbness  All other systems reviewed and are negative  Physical Exam  Physical Exam  Vitals and nursing note reviewed  Constitutional:       General: She is not in acute distress  Appearance: Normal appearance  She is well-developed  She is not ill-appearing, toxic-appearing or diaphoretic  HENT:      Head: Normocephalic and atraumatic  Right Ear: External ear normal       Left Ear: External ear normal       Nose: Nose normal       Mouth/Throat:      Mouth: Mucous membranes are moist    Eyes:      General: No scleral icterus  Right eye: No discharge  Left eye: No discharge        Conjunctiva/sclera: Conjunctivae normal    Cardiovascular:      Rate and Rhythm: Normal rate and regular rhythm  Heart sounds: No murmur heard  Pulmonary:      Effort: Pulmonary effort is normal  No respiratory distress  Breath sounds: Normal breath sounds  Abdominal:      Palpations: Abdomen is soft  Tenderness: There is no abdominal tenderness  Musculoskeletal:         General: No swelling, deformity or signs of injury  Normal range of motion  Cervical back: Normal range of motion and neck supple  No rigidity  Skin:     General: Skin is warm and dry  Capillary Refill: Capillary refill takes less than 2 seconds  Coloration: Skin is not jaundiced  Findings: No erythema or rash  Neurological:      General: No focal deficit present  Mental Status: She is alert and oriented to person, place, and time  Mental status is at baseline  Cranial Nerves: Cranial nerves 2-12 are intact  No cranial nerve deficit  Sensory: Sensation is intact  Motor: Motor function is intact  Coordination: Coordination is intact  Gait: Gait is intact  Gait normal    Psychiatric:         Mood and Affect: Mood normal          Behavior: Behavior normal          Thought Content:  Thought content normal          Judgment: Judgment normal          Vital Signs  ED Triage Vitals   Temperature Pulse Respirations Blood Pressure SpO2   04/27/23 1619 04/27/23 1619 04/27/23 1619 04/27/23 1619 04/27/23 1619   98 6 °F (37 °C) 104 18 (!) 200/127 100 %      Temp src Heart Rate Source Patient Position - Orthostatic VS BP Location FiO2 (%)   -- 04/27/23 1619 04/27/23 1830 -- --    Monitor Lying        Pain Score       04/27/23 1855       4           Vitals:    04/27/23 1730 04/27/23 1830 04/27/23 1930 04/27/23 2000   BP: 169/79 (!) 177/88 167/82 165/85   Pulse: 68 73 68 71   Patient Position - Orthostatic VS:  Lying           Visual Acuity  Visual Acuity    Flowsheet Row Most Recent Value   L Pupil Size (mm) 3   R Pupil Size (mm) 3          ED Medications  Medications   sodium chloride 0 9 % bolus 1,000 mL (0 mL Intravenous Stopped 4/27/23 1755)   losartan (COZAAR) tablet 50 mg (50 mg Oral Given 4/27/23 1705)   labetalol (NORMODYNE) injection 10 mg (10 mg Intravenous Given 4/27/23 1704)   acetaminophen (TYLENOL) tablet 650 mg (650 mg Oral Given 4/27/23 1855)       Diagnostic Studies  Results Reviewed     Procedure Component Value Units Date/Time    HS Troponin I 2hr [[id number]]  (Normal) Collected: 04/27/23 1900    Lab Status: Final result Specimen: Blood from Arm, Left Updated: 04/27/23 1936     hs TnI 2hr 3 ng/L      Delta 2hr hsTnI 0 ng/L     D-Dimer [[id number]]  (Normal) Collected: 04/27/23 1818    Lab Status: Final result Specimen: Blood from Arm, Right Updated: 04/27/23 1840     D-Dimer, Quant 0 28 ug/ml FEU     Narrative: In the evaluation for possible pulmonary embolism, in the appropriate (Well's Score of 4 or less) patient, the age adjusted d-dimer cutoff for this patient can be calculated as:    Age x 0 01 (in ug/mL) for Age-adjusted D-dimer exclusion threshold for a patient over 50 years      Comprehensive metabolic panel [[id number]]  (Abnormal) Collected: 04/27/23 1653    Lab Status: Final result Specimen: Blood from Arm, Right Updated: 04/27/23 1739     Sodium 138 mmol/L      Potassium 5 0 mmol/L      Chloride 105 mmol/L      CO2 23 mmol/L      ANION GAP 10 mmol/L      BUN 10 mg/dL      Creatinine 0 76 mg/dL      Glucose 98 mg/dL      Calcium 10 1 mg/dL      AST 28 U/L      ALT 15 U/L      Alkaline Phosphatase 90 U/L      Total Protein 8 2 g/dL      Albumin 5 2 g/dL      Total Bilirubin 1 68 mg/dL      eGFR 77 ml/min/1 73sq m     Narrative:      [place] guidelines for Chronic Kidney Disease (CKD):   •  Stage 1 with normal or high GFR (GFR > 90 mL/min/1 73 square meters)  •  Stage 2 Mild CKD (GFR = 60-89 mL/min/1 73 square meters)  •  Stage 3A Moderate CKD (GFR = 45-59 mL/min/1 73 square meters)  •  Stage 3B Moderate CKD (GFR = 30-44 mL/min/1 73 square meters)  •  Stage 4 Severe CKD (GFR = 15-29 mL/min/1 73 square meters)  •  Stage 5 End Stage CKD (GFR <15 mL/min/1 73 square meters)  Note: GFR calculation is accurate only with a steady state creatinine    HS Troponin 0hr (reflex protocol) [[id number]]  (Normal) Collected: 04/27/23 1653    Lab Status: Final result Specimen: Blood from Arm, Right Updated: 04/27/23 1733     hs TnI 0hr 3 ng/L     CBC and differential [[id number]]  (Abnormal) Collected: 04/27/23 1653    Lab Status: Final result Specimen: Blood from Arm, Right Updated: 04/27/23 1714     WBC 7 21 Thousand/uL      RBC 5 18 Million/uL      Hemoglobin 15 6 g/dL      Hematocrit 46 5 %      MCV 90 fL      MCH 30 1 pg      MCHC 33 5 g/dL      RDW 12 2 %      MPV 12 4 fL      Platelets 899 Thousands/uL      nRBC 0 /100 WBCs      Neutrophils Relative 61 %      Immat GRANS % 0 %      Lymphocytes Relative 29 %      Monocytes Relative 7 %      Eosinophils Relative 2 %      Basophils Relative 1 %      Neutrophils Absolute 4 44 Thousands/µL      Immature Grans Absolute 0 01 Thousand/uL      Lymphocytes Absolute 2 07 Thousands/µL      Monocytes Absolute 0 51 Thousand/µL      Eosinophils Absolute 0 12 Thousand/µL      Basophils Absolute 0 06 Thousands/µL                  XR chest 2 views    (Results Pending)              Procedures  ECG 12 Lead Documentation Only    Date/Time: 4/27/2023 4:21 PM  Performed by: Neda Temple PA-C  Authorized by: Neda Temple PA-C     Indications / Diagnosis:  HTN  ECG reviewed by me, the ED Provider: yes    Patient location:  ED  Previous ECG:     Previous ECG:  Unavailable  Interpretation:     Interpretation: abnormal    Rate:     ECG rate:  97    ECG rate assessment: normal    Rhythm:     Rhythm: sinus rhythm    Ectopy:     Ectopy: none    QRS:     QRS axis:  Left    QRS intervals:  Normal  Conduction:     Conduction: normal    ST segments:     ST segments:  Normal  T waves:     T waves: normal               ED Course SBIRT 20yo+    Flowsheet Row Most Recent Value   Initial Alcohol Screen: US AUDIT-C     1  How often do you have a drink containing alcohol? 0 Filed at: 04/27/2023 1635   2  How many drinks containing alcohol do you have on a typical day you are drinking? 0 Filed at: 04/27/2023 1635   3a  Male UNDER 65: How often do you have five or more drinks on one occasion? 0 Filed at: 04/27/2023 1635   3b  FEMALE Any Age, or MALE 65+: How often do you have 4 or more drinks on one occassion? 0 Filed at: 04/27/2023 1635   Audit-C Score 0 Filed at: 04/27/2023 1635   ADRIAN: How many times in the past year have you    Used an illegal drug or used a prescription medication for non-medical reasons? Never Filed at: 04/27/2023 1635                    Medical Decision Making    This is a 78-year-old female presenting to the emergency department for evaluation of elevated blood pressure  Patient reports last night she noted elevated blood pressure after having a sensation of her heart racing  Patient reports at that time she was very worked up and anxious prior to take her blood pressure  Patient ports the palpitations have resolved but reported approximately 1 hour ago she had a sensation of cooling down her left arm followed by warmth  Patient reports this only lasted a few seconds and has since resolved  Patient reports she has been having a headache for the past few weeks on and off, patient reports this is from her cervical strain  Patient denies any worsening headache today  Patient is well-appearing on initial examination, hypertensive  Differential diagnosis to include but is not limited to: Arrhythmia, ACS, STEMI, electrolyte abnormality    Initial ED Plan: CBC, CMP, troponin, EKG, chest x-ray    ED results: Xray images chest independently visualized and interpreted by me - no acute cardiopulmonary disease     All radiology studies independently viewed by me and interpreted by the radiologist   0 hour troponin: 3  Delta: 0  Heart score: 3  -Patient's blood pressure decreased with home dose of medication and dose of labetalol   -She reports her headache has improved since medication  Patient continues to deny any chest pain, shortness of breath or difficulty breathing  Final ED assessment: Patient is stable and well appearing  Discussed radiologic studies and laboratory results  Discussed follow-up with PCP  Strict return precautions were discussed including but not limited to chest pain, shortness of breath, difficulty breathing, headache, dizziness  Patient verbalized understanding and is agreeable with the plan for discharge  Amount and/or Complexity of Data Reviewed  Labs: ordered  Radiology: ordered  Risk  OTC drugs  Prescription drug management  Disposition  Final diagnoses:   High blood pressure     Time reflects when diagnosis was documented in both MDM as applicable and the Disposition within this note     Time User Action Codes Description Comment    4/27/2023  7:56 PM Philip Daniels Add [I10] High blood pressure       ED Disposition     ED Disposition   Discharge    Condition   Stable    Date/Time   Thu Apr 27, 2023  7:56 PM    Comment   Cayla Bojorquez discharge to home/self care                 Follow-up Information     Follow up With Specialties Details Why Contact Info Additional [address], DO Family Medicine Call in 3 days For follow up [address] [facility] [phone] [facility] Emergency Department Emergency Medicine Go to  If symptoms worsen [address] Emergency Department, [address] [place], [place], [address]          Discharge Medication List as of 4/27/2023  7:57 PM      CONTINUE these medications which have NOT CHANGED    Details   amLODIPine (NORVASC) 5 mg tablet amlodipine 5 mg tablet, Historical Med      aspirin (ECOTRIN LOW STRENGTH) 81 mg EC tablet Take 81 mg by mouth, Starting Tue 9/1/2015, Historical Med      atorvastatin (LIPITOR) 40 mg tablet ONE PILL BY MOUTH ONCE A DAY   PLEASE CALL FOR OFFICE VISIT , Historical Med      buPROPion (WELLBUTRIN) 100 mg tablet TAKE 1 TAB BY MOUTH 2 TIMES A DAY , Historical Med      Cholecalciferol (D 1000) 1000 units CHEW Chew, Historical Med      clonazePAM (KlonoPIN) 1 mg tablet One pill by mouth at bedtime as needed, Historical Med      conjugated estrogens (PREMARIN) vaginal cream Premarin 0 625 mg/gram vaginal cream, Historical Med      desonide (DESOWEN) 0 05 % ointment APPLY SPARINGLY TO AFFECTED AREA(S) TWICE DAILY, Normal      econazole nitrate 1 % cream econazole 1 % topical cream, Historical Med      estradiol (ESTRACE) 0 1 mg/g vaginal cream As needed , Historical Med      fluconazole (DIFLUCAN) 200 mg tablet fluconazole 200 mg tablet, Historical Med      fluticasone (CUTIVATE) 0 05 % cream Apply topically daily Rash till resolved, Starting Thu 8/19/2021, Normal      fluticasone (FLONASE) 50 mcg/act nasal spray fluticasone propionate 50 mcg/actuation nasal spray,suspension, Historical Med      ibuprofen (MOTRIN) 600 mg tablet Starting Wed 4/6/2022, Historical Med      losartan (COZAAR) 50 mg tablet TAKE 1 TABLET BY MOUTH DAILY, Historical Med      methocarbamol (ROBAXIN) 500 mg tablet Take 1 tablet by mouth 4 (four) times a day as needed for muscle spasms for up to 10 days, Starting Fri 7/7/2017, Until Tue 5/7/2019, Print      naproxen (NAPROSYN) 500 mg tablet Take 1 tablet by mouth 2 (two) times a day with meals, Starting Fri 7/7/2017, Until Sat 7/7/2018, Print      omeprazole (PriLOSEC) 20 mg delayed release capsule Starting Tue 9/13/2016, Historical Med      pimecrolimus (ELIDEL) 1 % cream Apply topically 2 (two) times a day To rash around mouth until resolved, Starting Thu 9/1/2022, Normal      sertraline (ZOLOFT) 25 mg tablet sertraline 25 mg tablet, Historical Med      !! Sod Fluoride-Potassium Nitrate 1 1-5 % PSTE PreviDent 5000 Sensitive 1 1 %-5 % dental paste, Historical Med      !! Sod Fluoride-Potassium Nitrate 1 1-5 % PSTE PreviDent 5000 Sensitive 1 1 %-5 % dental paste, Historical Med       !! - Potential duplicate medications found  Please discuss with provider  No discharge procedures on file      PDMP Review     None          ED Provider  Electronically Signed by           Anushka Ames PA-C  04/27/23 5938

## 2023-04-28 NOTE — ED NOTES
Alert oriented x4 in no acute distress per pt headache 1 out of 10  Discharged to home with written and verbal instructions       Mike Horta RN  04/27/23 2009

## 2023-04-29 LAB
ATRIAL RATE: 97 BPM
P AXIS: 67 DEGREES
PR INTERVAL: 182 MS
QRS AXIS: -30 DEGREES
QRSD INTERVAL: 110 MS
QT INTERVAL: 374 MS
QTC INTERVAL: 474 MS
T WAVE AXIS: 62 DEGREES
VENTRICULAR RATE: 97 BPM

## 2023-07-25 ENCOUNTER — OFFICE VISIT (OUTPATIENT)
Age: 73
End: 2023-07-25
Payer: COMMERCIAL

## 2023-07-25 ENCOUNTER — APPOINTMENT (OUTPATIENT)
Age: 73
End: 2023-07-25
Payer: COMMERCIAL

## 2023-07-25 VITALS — BODY MASS INDEX: 24.45 KG/M2 | WEIGHT: 138 LBS | HEIGHT: 63 IN

## 2023-07-25 DIAGNOSIS — R21 RASH: ICD-10-CM

## 2023-07-25 DIAGNOSIS — R21 RASH: Primary | ICD-10-CM

## 2023-07-25 LAB
B BURGDOR IGG+IGM SER QL IA: POSITIVE
BASOPHILS # BLD AUTO: 0.05 THOUSANDS/ÂΜL (ref 0–0.1)
BASOPHILS NFR BLD AUTO: 1 % (ref 0–1)
EOSINOPHIL # BLD AUTO: 0.16 THOUSAND/ÂΜL (ref 0–0.61)
EOSINOPHIL NFR BLD AUTO: 3 % (ref 0–6)
ERYTHROCYTE [DISTWIDTH] IN BLOOD BY AUTOMATED COUNT: 12 % (ref 11.6–15.1)
HCT VFR BLD AUTO: 40.1 % (ref 34.8–46.1)
HGB BLD-MCNC: 13.1 G/DL (ref 11.5–15.4)
IMM GRANULOCYTES # BLD AUTO: 0.01 THOUSAND/UL (ref 0–0.2)
IMM GRANULOCYTES NFR BLD AUTO: 0 % (ref 0–2)
LYMPHOCYTES # BLD AUTO: 1.56 THOUSANDS/ÂΜL (ref 0.6–4.47)
LYMPHOCYTES NFR BLD AUTO: 28 % (ref 14–44)
MCH RBC QN AUTO: 30 PG (ref 26.8–34.3)
MCHC RBC AUTO-ENTMCNC: 32.7 G/DL (ref 31.4–37.4)
MCV RBC AUTO: 92 FL (ref 82–98)
MONOCYTES # BLD AUTO: 0.45 THOUSAND/ÂΜL (ref 0.17–1.22)
MONOCYTES NFR BLD AUTO: 8 % (ref 4–12)
NEUTROPHILS # BLD AUTO: 3.4 THOUSANDS/ÂΜL (ref 1.85–7.62)
NEUTS SEG NFR BLD AUTO: 60 % (ref 43–75)
NRBC BLD AUTO-RTO: 0 /100 WBCS
PLATELET # BLD AUTO: 165 THOUSANDS/UL (ref 149–390)
PMV BLD AUTO: 12.5 FL (ref 8.9–12.7)
RBC # BLD AUTO: 4.37 MILLION/UL (ref 3.81–5.12)
WBC # BLD AUTO: 5.63 THOUSAND/UL (ref 4.31–10.16)

## 2023-07-25 PROCEDURE — 85025 COMPLETE CBC W/AUTO DIFF WBC: CPT

## 2023-07-25 PROCEDURE — 99213 OFFICE O/P EST LOW 20 MIN: CPT | Performed by: DERMATOLOGY

## 2023-07-25 PROCEDURE — 86617 LYME DISEASE ANTIBODY: CPT

## 2023-07-25 PROCEDURE — 36415 COLL VENOUS BLD VENIPUNCTURE: CPT

## 2023-07-25 PROCEDURE — 86618 LYME DISEASE ANTIBODY: CPT

## 2023-07-25 NOTE — PATIENT INSTRUCTIONS
RASH        Assessment and Plan:  Based on a thorough discussion of this condition and the management approach to it (including a comprehensive discussion of the known risks, side effects and potential benefits of treatment), the patient (family) agrees to implement the following specific plan:  Labs ordered for Lyme's disease. Refer to PCP to follow up.

## 2023-07-25 NOTE — PROGRESS NOTES
[facility] Note     Patient Name: Lizet You  Encounter Date: 07/25/2023    Have you been cared for by a [facility] Dermatologist in the last 3 years and, if so, which description applies to you? Yes. I have been here within the last 3 years, and my medical history has NOT changed since that time. I am FEMALE/of child-bearing potential.    REVIEW OF SYSTEMS:  Have you recently had or currently have any of the following? · No changes in my recent health. PAST MEDICAL HISTORY:  Have you personally ever had or currently have any of the following? If "YES," then please provide more detail. · No changes in my medical history. FAMILY HISTORY:  Any "first degree relatives" (parent, brother, sister, or child) with the following? • No changes in my family's known health. PATIENT EXPERIENCE:    • Do you want the Dermatologist to perform a COMPLETE skin exam today including a clinical examination under the "bra and underwear" areas? NO  • If necessary, do we have your permission to call and leave a detailed message on your Preferred Phone number that includes your specific medical information? NO      Allergies   Allergen Reactions   • Lisinopril      Other reaction(s): Cough   • Penicillins Hives   • Penicillin G      Other reaction(s): Other (Please comment)  Pt was young when she had reaction, does not recall what type of reaction she had      Current Outpatient Medications:   •  aspirin (ECOTRIN LOW STRENGTH) 81 mg EC tablet, Take 81 mg by mouth, Disp: , Rfl:   •  atorvastatin (LIPITOR) 40 mg tablet, ONE PILL BY MOUTH ONCE A DAY.  PLEASE CALL FOR OFFICE VISIT., Disp: , Rfl:   •  buPROPion (Wellbutrin XL) 300 mg 24 hr tablet, , Disp: , Rfl:   •  desonide (DESOWEN) 0.05 % ointment, APPLY SPARINGLY TO AFFECTED AREA(S) TWICE DAILY, Disp: 15 g, Rfl: 1  •  econazole nitrate 1 % cream, econazole 1 % topical cream, Disp: , Rfl:   •  fluticasone (FLONASE) 50 mcg/act nasal spray, fluticasone propionate 50 mcg/actuation nasal spray,suspension, Disp: , Rfl:   •  ibuprofen (MOTRIN) 600 mg tablet, , Disp: , Rfl:   •  losartan (COZAAR) 100 MG tablet, , Disp: , Rfl:   •  losartan (COZAAR) 50 mg tablet, TAKE 1 TABLET BY MOUTH DAILY, Disp: , Rfl:   •  methocarbamol (ROBAXIN) 500 mg tablet, Take 1 tablet by mouth 4 (four) times a day as needed for muscle spasms for up to 10 days (Patient not taking: Reported on 5/7/2019), Disp: 20 tablet, Rfl: 0  •  naproxen (NAPROSYN) 500 mg tablet, Take 1 tablet by mouth 2 (two) times a day with meals (Patient not taking: Reported on 5/7/2019), Disp: 20 tablet, Rfl: 0  •  omeprazole (PriLOSEC) 20 mg delayed release capsule, , Disp: , Rfl:   •  pimecrolimus (ELIDEL) 1 % cream, Apply topically 2 (two) times a day To rash around mouth until resolved, Disp: 30 g, Rfl: 1  •  sertraline (ZOLOFT) 25 mg tablet, sertraline 25 mg tablet, Disp: , Rfl:   •  Sod Fluoride-Potassium Nitrate 1.1-5 % PSTE, PreviDent 5000 Sensitive 1.1 %-5 % dental paste, Disp: , Rfl:   •  Sod Fluoride-Potassium Nitrate 1.1-5 % PSTE, PreviDent 5000 Sensitive 1.1 %-5 % dental paste, Disp: , Rfl:           • Whom besides the patient is providing clinical information about today's encounter?   o NO ADDITIONAL HISTORIAN (patient alone provided history)  o Parent/Guardian provided history (due to age/developmental stage of patient)    Physical Exam and Assessment/Plan by Diagnosis:    Patient present today for a rash she noticed 4 days ago. Patient statred she does have history of lyme disease. Patient states she had the same rash on her breast and was told it was due to lymes disease. Patient seen by me in 2017    RASH    Physical Exam:  • (Anatomic Location); (Size and Morphological Description); (Differential Diagnosis):  o Left buttocks; erythema well demarcated patch; without any central clearing   • Pertinent Positives:  • Pertinent Negatives:     Additional History of Present Condition:  Patient here for rash she noticed 4 days ago, has not tried any medication to treat rash. C/o fatigue, neck ache and not feeling well    Assessment and Plan:  Based on a thorough discussion of this condition and the management approach to it (including a comprehensive discussion of the known risks, side effects and potential benefits of treatment), the patient (family) agrees to implement the following specific plan:  • Labs ordered for Lyme's disease. CBC  • Refer to PCP to follow up.      Scribe Attestation    I,:  Dariel Lala MA am acting as a scribe while in the presence of the attending physician.:       I,:  Marcelo Whitfield MD personally performed the services described in this documentation    as scribed in my presence.:

## 2023-07-26 DIAGNOSIS — A69.20 LYME DISEASE: Primary | ICD-10-CM

## 2023-07-26 LAB
B BURGDOR IGG SERPL QL IA: POSITIVE
B BURGDOR IGM SERPL QL IA: POSITIVE

## 2023-07-26 RX ORDER — DOXYCYCLINE HYCLATE 100 MG/1
100 CAPSULE ORAL EVERY 12 HOURS SCHEDULED
Qty: 42 CAPSULE | Refills: 0 | Status: SHIPPED | OUTPATIENT
Start: 2023-07-26 | End: 2023-08-16

## 2023-09-07 ENCOUNTER — OFFICE VISIT (OUTPATIENT)
Age: 73
End: 2023-09-07

## 2023-09-07 VITALS — BODY MASS INDEX: 23.74 KG/M2 | HEIGHT: 63 IN | WEIGHT: 134 LBS

## 2023-09-07 DIAGNOSIS — L82.1 SEBORRHEIC KERATOSIS: ICD-10-CM

## 2023-09-07 DIAGNOSIS — D18.01 CHERRY ANGIOMA: ICD-10-CM

## 2023-09-07 DIAGNOSIS — D22.9 NEVUS: ICD-10-CM

## 2023-09-07 DIAGNOSIS — Z13.89 SCREENING FOR SKIN CONDITION: Primary | ICD-10-CM

## 2023-09-07 DIAGNOSIS — D48.9 NEOPLASM OF UNCERTAIN BEHAVIOR: ICD-10-CM

## 2023-09-07 PROCEDURE — 88305 TISSUE EXAM BY PATHOLOGIST: CPT | Performed by: STUDENT IN AN ORGANIZED HEALTH CARE EDUCATION/TRAINING PROGRAM

## 2023-09-07 NOTE — PATIENT INSTRUCTIONS
II.  RISKS AND POTENTIAL COMPLICATIONS   I understand the risks and potential complications of a skin biopsy include but are not limited to the following:  Bleeding  Infection  Pain  Scar/keloid  Skin discoloration  Incomplete Removal  Recurrence  Nerve Damage/Numbness/Loss of Function  Allergic Reaction to Anesthesia  Biopsies are diagnostic procedures and based on findings additional treatment or evaluation may be required  Loss or destruction of specimen resulting in no additional findings    My Dermatologist has explained to me the nature of the condition, the nature of the procedure, and the benefits to be reasonably expected compared with alternative approaches. My Dermatologist has discussed the likelihood of major risks or complications of this procedure including the specific risks listed above, such as bleeding, infection, and scarring/keloid. I understand that a scar is expected after this procedure. I understand that my physician cannot predict if the scar will form a "keloid," which extends beyond the borders of the wound that is created. A keloid is a thick, painful, and bumpy scar. A keloid can be difficult to treat, as it does not always respond well to therapy, which includes injecting cortisone directly into the keloid every few weeks. While this usually reduces the pain and size of the scar, it does not eliminate it. I understand that photographs may be taken before and after the procedure. These will be maintained as part of the medical providers confidential records and may not be made available to me. I further authorize the medical provider to use the photographs for teaching purposes or to illustrate scientific papers, books, or lectures if in his/her judgment, medical research, education, or science may benefit from its use. I have had an opportunity to fully inquire about the risks and benefits of this procedure and its alternatives.    I have been given ample time and opportunity to ask questions and to seek a second opinion if I wished to do so. I acknowledge that there have specifically been no guarantees as to the cosmetic results from the procedure. I am aware that with any procedure there is always the possibility of an unexpected complication. III. POST-PROCEDURAL CARE (WHAT YOU WILL NEED TO DO "AFTER THE BIOPSY" TO OPTIMIZE HEALING)    Keep the area clean and dry. Try NOT to remove the bandage or get it wet for the first 24 hours. Gently clean the area and apply surgical ointment (such as Vaseline petrolatum ointment, which is available "over the counter" and not a prescription) to the biopsy site for up to 2 weeks straight. This acts to protect the wound from the outside world. Sutures are not usually placed in this procedure. If any sutures were placed, return for suture removal as instructed (generally 1 week for the face, 2 weeks for the body). Take Acetaminophen (Tylenol) for discomfort, if no contraindications. Ibuprofen or aspirin could make bleeding worse. Call our office immediately for signs of infection: fever, chills, increased redness, warmth, tenderness, discomfort/pain, or pus or foul smell coming from the wound. WHAT TO DO IF THERE IS ANY BLEEDING? If a small amount of bleeding is noticed, place a clean cloth over the area and apply firm pressure for ten minutes. Check the wound after 10 minutes of direct pressure. If bleeding persists, try one more time for an additional 10 minutes of direct pressure on the area. If the bleeding becomes heavier or does not stop after the second attempt, or if you have any other questions about this procedure, then please call your [facility] by calling [phone] (SKIN). I hereby acknowledge that I have reviewed and verified the site with my Dermatologist and have requested and authorized my Dermatologist to proceed with the procedure.         MELANOCYTIC NEVI ("Moles")      Melanocytic nevi ("moles") are tan or brown, raised or flat areas of the skin which have an increased number of melanocytes. Melanocytes are the cells in our body which make pigment and account for skin color. Some moles are present at birth (I.e., "congenital nevi"), while others come up later in life (i.e., "acquired nevi"). The sun can stimulate the body to make more moles. Sunburns are not the only thing that triggers more moles. Chronic sun exposure can do it too. Clinically distinguishing a healthy mole from melanoma may be difficult, even for experienced dermatologists. The "ABCDE's" of moles have been suggested as a means of helping to alert a person to a suspicious mole and the possible increased risk of melanoma. The suggestions for raising alert are as follows:    Asymmetry: Healthy moles tend to be symmetric, while melanomas are often asymmetric. Asymmetry means if you draw a line through the mole, the two halves do not match in color, size, shape, or surface texture. Asymmetry can be a result of rapid enlargement of a mole, the development of a raised area on a previously flat lesion, scaling, ulceration, bleeding or scabbing within the mole. Any mole that starts to demonstrate "asymmetry" should be examined promptly by a board certified dermatologist.     Border: Healthy moles tend to have discrete, even borders. The border of a melanoma often blends into the normal skin and does not sharply delineate the mole from normal skin. Any mole that starts to demonstrate "uneven borders" should be examined promptly by a board certified dermatologist.     Color: Healthy moles tend to be one color throughout. Melanomas tend to be made up of different colors ranging from dark black, blue, white, or red.   Any mole that demonstrates a color change should be examined promptly by a board certified dermatologist.     Diameter: Healthy moles tend to be smaller than 0.6 cm in size; an exception are "congenital nevi" that can be larger. Melanomas tend to grow and can often be greater than 0.6 cm (1/4 of an inch, or the size of a pencil eraser). This is only a guideline, and many normal moles may be larger than 0.6 cm without being unhealthy. Any mole that starts to change in size (small to bigger or bigger to smaller) should be examined promptly by a board certified dermatologist.     Evolving: Healthy moles tend to "stay the same."  Melanomas may often show signs of change or evolution such as a change in size, shape, color, or elevation. Any mole that starts to itch, bleed, crust, burn, hurt, or ulcerate or demonstrate a change or evolution should be examined promptly by a board certified dermatologist.      Dysplastic Nevi  Dysplastic moles are moles that fit the ABCDE rules of melanoma but are not identified as melanomas when examined under the microscope. They may indicate an increased risk of melanoma in that person. If there is a family history of melanoma, most experts agree that the person may be at an increased risk for developing a melanoma. Experts still do not agree on what dysplastic moles mean in patients without a personal or family history of melanoma. Dysplastic moles are usually larger than common moles and have different colors within it with irregular borders. The appearance can be very similar to a melanoma. Biopsies of dysplastic moles may show abnormalities which are different from a regular mole. Melanoma  Malignant melanoma is a type of skin cancer that can be deadly if it spreads throughout the body. The incidence of melanoma in the [facility] is growing faster than any other cancer. Melanoma usually grows near the surface of the skin for a period of time, and then begins to grow deeper into the skin. Once it grows deeper into the skin, the risk of spread to other organs greatly increases.  Therefore, early detection and removal of a malignant melanoma may result in a better chance at a complete cure; removal after the tumor has spread may not be as effective, leading to worse clinical outcomes such as death. The true rate of nevus transformation into a melanoma is unknown. It has been estimated that the lifetime risk for any acquired melanocytic nevus on any 21year-old individual transforming into melanoma by age 80 is 0.03% (1 in 3,164) for men and 0.009% (1 in 10,800) for women. The appearance of a "new mole" remains one of the most reliable methods for identifying a malignant melanoma. Occasionally, melanomas appear as rapidly growing, blue-black, dome-shaped bumps within a previous mole or previous area of normal skin. Other times, melanomas are suspected when a mole suddenly appears or changes. Itching, burning, or pain in a pigmented lesion should increase suspicion, but most patients with early melanoma have no skin discomfort whatsoever. Melanoma can occur anywhere on the skin, including areas that are difficult for self-examination. Many melanomas are first noticed by other family members. Suspicious-looking moles may be removed for microscopic examination. You may be able to prevent death from melanoma by doing two simple things:    Try to avoid unnecessary sun exposure and protect your skin when it is exposed to the sun. People who live near the equator, people who have intermittent exposures to large amounts of sun, and people who have had sunburns in childhood or adolescence have an increased risk for melanoma. Sun sense and vigilant sun protection may be keys to helping to prevent melanoma. We recommend wearing UPF-rated sun protective clothing and sunglasses whenever possible and applying a moisturizer-sunscreen combination product (SPF 50+) such as Neutrogena Daily Defense to sun exposed areas of skin at least three times a day.     Have your moles regularly examined by a board certified dermatologist AND by yourself or a family member/friend at home. We recommend that you have your moles examined at least once a year by a board certified dermatologist.  Use your birthday as an annual reminder to have your "Birthday Suit" (I.e., your skin) examined; it is a nice birthday gift to yourself to know that your skin is healthy appearing! Additionally, at-home self examinations may be helpful for detecting a possible melanoma. Use the ABCDEs we discussed and check your moles once a month at home. SEBORRHEIC KERATOSIS  A seborrheic keratosis is a harmless warty spot that appears during adult life as a common sign of skin aging. Seborrheic keratoses can arise on any area of skin, covered or uncovered, with the usual exception of the palms and soles. They do not arise from mucous membranes. Seborrheic keratoses can have highly variable appearance. Seborrheic keratoses are extremely common. It has been estimated that over 90% of adults over the age of 61 years have one or more of them. They occur in males and females of all races, typically beginning to erupt in the 35s or 45s. They are uncommon under the age of 21 years. The precise cause of seborrhoeic keratoses is not known. Seborrhoeic keratoses are considered degenerative in nature. As time goes by, seborrheic keratoses tend to become more numerous. Some people inherit a tendency to develop a very large number of them; some people may have hundreds of them. The name "seborrheic keratosis" is misleading, because these lesions are not limited to a seborrhoeic distribution (scalp, mid-face, chest, upper back), nor are they formed from sebaceous glands, nor are they associated with sebum -- which is greasy. Seborrheic keratosis may also be called "SK," "Seb K," "basal cell papilloma," "senile wart," or "barnacle."      There is no easy way to remove multiple lesions on a single occasion.   Unless a specific lesion is "inflamed" and is causing pain or stinging/burning or is bleeding, most insurance companies do not authorize treatment. ANGIOMA ("CHERRY ANGIOMA")  [place] angiomas markedly increase in number from about the age of 36, so it has been estimated that 75% of people over 76years of age have them. Although they also called "senile angiomas," they can occur in young people too - 5% of adolescents have been found to have them. Cherry angiomas are very common in males and females of any age or race, with no difference in sexes or races affected. They are however more noticeable in white skin than in skin of colour. There may be a family history of similar lesions. Eruptive (very large number appearing in a short period of time) cherry angiomas have been rarely reported to be associated with internal malignancy and pregnancy.

## 2023-09-07 NOTE — PROGRESS NOTES
[facility] Note     Patient Name: Denisse Whatley  Encounter Date: 9/7/2023    Have you been cared for by a [facility]ologist in the last 3 years and, if so, which description applies to you? Yes. I have been here within the last 3 years, and my medical history has NOT changed since that time. I am FEMALE/of child-bearing potential.    REVIEW OF SYSTEMS:  Have you recently had or currently have any of the following? · No changes in my recent health. PAST MEDICAL HISTORY:  Have you personally ever had or currently have any of the following? If "YES," then please provide more detail. · No changes in my medical history. FAMILY HISTORY:  Any "first degree relatives" (parent, brother, sister, or child) with the following? • No changes in my family's known health. PATIENT EXPERIENCE:    • Do you want the Dermatologist to perform a COMPLETE skin exam today including a clinical examination under the "bra and underwear" areas? Yes  • If necessary, do we have your permission to call and leave a detailed message on your Preferred Phone number that includes your specific medical information? Yes      Allergies   Allergen Reactions   • Lisinopril      Other reaction(s): Cough   • Penicillins Hives   • Penicillin G      Other reaction(s): Other (Please comment)  Pt was young when she had reaction, does not recall what type of reaction she had      Current Outpatient Medications:   •  aspirin (ECOTRIN LOW STRENGTH) 81 mg EC tablet, Take 81 mg by mouth, Disp: , Rfl:   •  atorvastatin (LIPITOR) 40 mg tablet, ONE PILL BY MOUTH ONCE A DAY.  PLEASE CALL FOR OFFICE VISIT., Disp: , Rfl:   •  buPROPion (Wellbutrin XL) 300 mg 24 hr tablet, , Disp: , Rfl:   •  desonide (DESOWEN) 0.05 % ointment, APPLY SPARINGLY TO AFFECTED AREA(S) TWICE DAILY, Disp: 15 g, Rfl: 1  •  econazole nitrate 1 % cream, econazole 1 % topical cream, Disp: , Rfl:   •  fluticasone (FLONASE) 50 mcg/act nasal spray, fluticasone propionate 50 mcg/actuation nasal spray,suspension, Disp: , Rfl:   •  ibuprofen (MOTRIN) 600 mg tablet, , Disp: , Rfl:   •  losartan (COZAAR) 100 MG tablet, , Disp: , Rfl:   •  losartan (COZAAR) 50 mg tablet, TAKE 1 TABLET BY MOUTH DAILY, Disp: , Rfl:   •  methocarbamol (ROBAXIN) 500 mg tablet, Take 1 tablet by mouth 4 (four) times a day as needed for muscle spasms for up to 10 days (Patient not taking: Reported on 5/7/2019), Disp: 20 tablet, Rfl: 0  •  naproxen (NAPROSYN) 500 mg tablet, Take 1 tablet by mouth 2 (two) times a day with meals (Patient not taking: Reported on 5/7/2019), Disp: 20 tablet, Rfl: 0  •  omeprazole (PriLOSEC) 20 mg delayed release capsule, , Disp: , Rfl:   •  pimecrolimus (ELIDEL) 1 % cream, Apply topically 2 (two) times a day To rash around mouth until resolved, Disp: 30 g, Rfl: 1  •  sertraline (ZOLOFT) 25 mg tablet, sertraline 25 mg tablet, Disp: , Rfl:   •  Sod Fluoride-Potassium Nitrate 1.1-5 % PSTE, PreviDent 5000 Sensitive 1.1 %-5 % dental paste, Disp: , Rfl:   •  Sod Fluoride-Potassium Nitrate 1.1-5 % PSTE, PreviDent 5000 Sensitive 1.1 %-5 % dental paste, Disp: , Rfl:           • Whom besides the patient is providing clinical information about today's encounter?   o NO ADDITIONAL HISTORIAN (patient alone provided history)     75year old female presents for a yearly skin exam. Patient has a spot of concern in left lower leg. Physical Exam and Assessment/Plan by Diagnosis:    NEOPLASM OF UNCERTAIN BEHAVIOR OF SKIN    Physical Exam:  • (Anatomic Location); (Size and Morphological Description); (Differential Diagnosis):  o 7 mm keratotic nodule Left lower leg r/o Squamous cell cancer  •   •       Additional History of Present Condition:  Present for 2 months    Assessment and Plan:  • I have discussed with the patient that a sample of skin via a "skin biopsy” would be potentially helpful to further make a specific diagnosis under the microscope.   • Based on a thorough discussion of this condition and the management approach to it (including a comprehensive discussion of the known risks, side effects and potential benefits of treatment), the patient (family) agrees to implement the following specific plan:    o Procedure:  Skin Biopsy. After a thorough discussion of treatment options and risk/benefits/alternatives (including but not limited to local pain, scarring, dyspigmentation, blistering, possible superinfection, and inability to confirm a diagnosis via histopathology), verbal and written consent were obtained and portion of the rash was biopsied for tissue sample. See below for consent that was obtained from patient and subsequent Procedure Note. PROCEDURE TANGENTIAL (SHAVE) BIOPSY NOTE:    • Performing Physician:   • Anatomic Location; Clinical Description with size (cm); Pre-Op Diagnosis:   o 7 mm keratotic nodule Left lower leg r/o Squamous cell cancer  • Post-op diagnosis: Same     • Local anesthesia: 1% xylocaine with epi      • Topical anesthesia: None    • Hemostasis: Aluminum chloride       After obtaining informed consent  at which time there was a discussion about the purpose of biopsy  and low risks of infection and bleeding. The area was prepped and draped in the usual fashion. Anesthesia was obtained with 1% lidocaine with epinephrine. A shave biopsy to an appropriate sampling depth was obtained by Shave (Dermablade or 15 blade) The resulting wound was covered with surgical ointment and bandaged appropriately. The patient tolerated the procedure well without complications and was without signs of functional compromise. Specimen has been sent for review by Dermatopathology. Standard post-procedure care has been explained and has been included in written form within the patient's copy of Informed Consent.     INFORMED CONSENT DISCUSSION AND POST-OPERATIVE INSTRUCTIONS FOR PATIENT    I.  RATIONALE FOR PROCEDURE  I understand that a skin biopsy allows the Dermatologist to test a lesion or rash under the microscope to obtain a diagnosis. It usually involves numbing the area with numbing medication and removing a small piece of skin; sometimes the area will be closed with sutures. In this specific procedure, sutures are not usually needed. If any sutures are placed, then they are usually need to be removed in 2 weeks or less. I understand that my Dermatologist recommends that a skin "shave" biopsy be performed today. A local anesthetic, similar to the kind that a dentist uses when filling a cavity, will be injected with a very small needle into the skin area to be sampled. The injected skin and tissue underneath "will go to sleep” and become numb so no pain should be felt afterwards. An instrument shaped like a tiny "razor blade" (shave biopsy instrument) will be used to cut a small piece of tissue and skin from the area so that a sample of tissue can be taken and examined more closely under the microscope. A slight amount of bleeding will occur, but it will be stopped with direct pressure and a pressure bandage and any other appropriate methods. I understands that a scar will form where the wound was created. Surgical ointment will be applied to help protect the wound. Sutures are not usually needed.     II.  RISKS AND POTENTIAL COMPLICATIONS   I understand the risks and potential complications of a skin biopsy include but are not limited to the following:  • Bleeding  • Infection  • Pain  • Scar/keloid  • Skin discoloration  • Incomplete Removal  • Recurrence  • Nerve Damage/Numbness/Loss of Function  • Allergic Reaction to Anesthesia  • Biopsies are diagnostic procedures and based on findings additional treatment or evaluation may be required  • Loss or destruction of specimen resulting in no additional findings    My Dermatologist has explained to me the nature of the condition, the nature of the procedure, and the benefits to be reasonably expected compared with alternative approaches. My Dermatologist has discussed the likelihood of major risks or complications of this procedure including the specific risks listed above, such as bleeding, infection, and scarring/keloid. I understand that a scar is expected after this procedure. I understand that my physician cannot predict if the scar will form a "keloid," which extends beyond the borders of the wound that is created. A keloid is a thick, painful, and bumpy scar. A keloid can be difficult to treat, as it does not always respond well to therapy, which includes injecting cortisone directly into the keloid every few weeks. While this usually reduces the pain and size of the scar, it does not eliminate it. I understand that photographs may be taken before and after the procedure. These will be maintained as part of the medical providers confidential records and may not be made available to me. I further authorize the medical provider to use the photographs for teaching purposes or to illustrate scientific papers, books, or lectures if in his/her judgment, medical research, education, or science may benefit from its use. I have had an opportunity to fully inquire about the risks and benefits of this procedure and its alternatives. I have been given ample time and opportunity to ask questions and to seek a second opinion if I wished to do so. I acknowledge that there have specifically been no guarantees as to the cosmetic results from the procedure. I am aware that with any procedure there is always the possibility of an unexpected complication. III. POST-PROCEDURAL CARE (WHAT YOU WILL NEED TO DO "AFTER THE BIOPSY" TO OPTIMIZE HEALING)    • Keep the area clean and dry. Try NOT to remove the bandage or get it wet for the first 24 hours.     • Gently clean the area and apply surgical ointment (such as Vaseline petrolatum ointment, which is available "over the counter" and not a prescription) to the biopsy site for up to 2 weeks straight. This acts to protect the wound from the outside world. • Sutures are not usually placed in this procedure. If any sutures were placed, return for suture removal as instructed (generally 1 week for the face, 2 weeks for the body). • Take Acetaminophen (Tylenol) for discomfort, if no contraindications. Ibuprofen or aspirin could make bleeding worse. • Call our office immediately for signs of infection: fever, chills, increased redness, warmth, tenderness, discomfort/pain, or pus or foul smell coming from the wound. WHAT TO DO IF THERE IS ANY BLEEDING? If a small amount of bleeding is noticed, place a clean cloth over the area and apply firm pressure for ten minutes. Check the wound after 10 minutes of direct pressure. If bleeding persists, try one more time for an additional 10 minutes of direct pressure on the area. If the bleeding becomes heavier or does not stop after the second attempt, or if you have any other questions about this procedure, then please call your [facility] by calling [phone] (SKIN). I hereby acknowledge that I have reviewed and verified the site with my Dermatologist and have requested and authorized my Dermatologist to proceed with the procedure. MELANOCYTIC NEVI ("Moles")    Physical Exam:  • Anatomic Location Affected: Mostly on sun-exposed areas of the body.    • Morphological Description:  Scattered, 1-4mm round to ovoid, symmetrical-appearing, even bordered, skin colored to dark brown macules/papules, mostly in sun-exposed areas    Additional History of Present Condition:  Present on exam.     Assessment and Plan:  Based on a thorough discussion of this condition and the management approach to it (including a comprehensive discussion of the known risks, side effects and potential benefits of treatment), the patient (family) agrees to implement the following specific plan:  • Provided handout with information regarding the ABCDE's of moles   • Recommend routine skin exams every year  • Sun avoidance, protective clothing (known as UPF clothing), and the use of at least SPF 30 sunscreens is advised. Sunscreen should be reapplied every two hours when outside. SEBORRHEIC KERATOSIS; NON-INFLAMED    Physical Exam:  • Anatomic Location Affected:  scattered across trunk, extremities,  face  • Morphological Description:  Flat and raised, waxy, smooth to warty textured, yellow to brownish-grey to dark brown to blackish, discrete, "stuck-on" appearing papules. Additional History of Present Condition:  Patient reports new bumps on the skin. Denies itch, burn, pain, bleeding or ulceration. Present constantly; nothing seems to make it worse or better. No prior treatment.       Assessment and Plan:  Based on a thorough discussion of this condition and the management approach to it (including a comprehensive discussion of the known risks, side effects and potential benefits of treatment), the patient (family) agrees to implement the following specific plan:  • Reassured benign      ANGIOMA ("CHERRY ANGIOMA")    Physical Exam:  • Anatomic Location: scattered across sun exposed areas of the trunk and extremities   • Morphologic Description: Firm red to reddish-blue discrete papules    Additional History of Present Condition:  Present on exam.    Assessment and Plan:  • Reassured benign      Scribe Attestation    I,:  Sherel Phalen am acting as a scribe while in the presence of the attending physician.:       I,:  Sherrell Medina MD personally performed the services described in this documentation    as scribed in my presence.:

## 2023-09-14 ENCOUNTER — TELEPHONE (OUTPATIENT)
Age: 73
End: 2023-09-14

## 2023-09-14 PROCEDURE — 88305 TISSUE EXAM BY PATHOLOGIST: CPT | Performed by: STUDENT IN AN ORGANIZED HEALTH CARE EDUCATION/TRAINING PROGRAM

## 2023-09-28 NOTE — PROGRESS NOTES
[facility] DERMATOLOGY  [address] [place] [id number]  [phone]  [phone]     MRN: [id number] : 1950  Encounter: [id number]  Patient Information: Monroe Guallpa  Chief complaint: facial rash and overall checkup    History of present illness:  78-year-old female presents for overall skin check several concerns 1  Facial rash that is been problematic patient reports that she tried the desonide we had given her previously for this without any improvement has tried different things on close including tea tree oil without any improvement he the switched to [employer] [place] toothpaste avoid other things that may irritate the area also concerned regarding irritation on her right lower leg after an injury and some other spots on her skin  Past Medical History:   Diagnosis Date    Ear problems     Hyperlipidemia     Hypertension     Psychiatric disorder      Past Surgical History:   Procedure Laterality Date    SINUS SURGERY       Social History   Social History     Substance and Sexual Activity   Alcohol Use No     Social History     Substance and Sexual Activity   Drug Use No     Social History     Tobacco Use   Smoking Status Never Smoker   Smokeless Tobacco Never Used     History reviewed  No pertinent family history  Meds/Allergies   Allergies   Allergen Reactions    Lisinopril      Other reaction(s): Cough    Penicillins Hives    Penicillin G      Other reaction(s): Other (Please comment)  Pt was young when she had reaction, does not recall what type of reaction she had       Meds:  Prior to Admission medications    Medication Sig Start Date End Date Taking? Authorizing Provider   aspirin (ECOTRIN LOW STRENGTH) 81 mg EC tablet Take 81 mg by mouth 9/1/15  Yes Historical Provider, MD   atorvastatin (LIPITOR) 40 mg tablet ONE PILL BY MOUTH ONCE A DAY  PLEASE CALL FOR OFFICE VISIT   16  Yes Historical Provider, MD   [facility]) [facility]  S311/01  HOSPITAL MEDICINE PROGRESS NOTE   Patient: Mel Phelan  Today's Date: 9/28/2023    YOB: 1942  Admission Date: 8/28/2023    MRN: [id number]  Inpatient LOS: 0    Attending: Ulisses Milner MD  Hospital Day: Hospital Day: 32    Subjective   HISTORY AND SUBJECTIVE COMPLAINTS     Chief Complaint:   Leg swelling    Interval History / Subjective:   Sitter feeding him breakfast this morning, patient has no major event over 24 hours.  Discussed with bedside RN    Hospital Course:  Mel Phelan is a 81 year old male who presented on 8/28/2023 with complaints of Leg Swelling  .  Medical history significant but not limited to dementia, stage III CKD, alcohol abuse, gout.  Presented to the emergency department with worsening of weakness and confusion.  Patient was recently admitted in the hospital, per patient POA is getting more confused and bed-bound.    ROS:  Pertinent systems negative except as above.    Objective   PHYSICAL EXAMINATION     Vital 24 Hour Range Most Recent Value   Temperature Temp  Min: 97.8 °F (36.6 °C)  Max: 99 °F (37.2 °C) 99 °F (37.2 °C)   Pulse Pulse  Min: 91  Max: 104 (!) 104   Respiratory Resp  Min: 18  Max: 18 18   Blood Pressure BP  Min: 150/76  Max: 162/83 (!) 153/77   Pulse Oximetry SpO2  Min: 89 %  Max: 97 % 97 %   Arterial BP No data recorded     O2 No data recorded       Recorded Intake and Output:    Intake/Output Summary (Last 24 hours) at 9/28/2023 1029  Last data filed at 9/27/2023 1400  Gross per 24 hour   Intake 120 ml   Output --   Net 120 ml      Recorded Last Stool Occurrence: 1 (09/28/23 0545)     Vital Most Recent Value First Value   Weight 63 kg (138 lb 14.2 oz) Weight: 67.3 kg (148 lb 4.8 oz)   Height 5' 6\" (167.6 cm) Height: 5' 6\" (167.6 cm)   BMI 22.42 N/A     General: Looks well well developed, well nourished  CV: regular rate and rhythm  Resp: clear to auscultation bilaterally  Abd: soft, nontender, nondistended and bowel sounds   mg tablet TAKE 1 TAB BY MOUTH 2 TIMES A DAY  6/13/16  Yes Historical Provider, MD   clonazePAM (KlonoPIN) 1 mg tablet One pill by mouth at bedtime as needed 7/3/17  Yes Historical Provider, MD   desonide (DESOWEN) 0 05 % ointment APPLY SPARINGLY TO AFFECTED AREA(S) TWICE DAILY 5/13/22  Yes Kesha Upton MD   econazole nitrate 1 % cream econazole 1 % topical cream   Yes Historical Provider, MD   estradiol (ESTRACE) 0 1 mg/g vaginal cream As needed    Yes Historical Provider, MD   fluconazole (DIFLUCAN) 200 mg tablet fluconazole 200 mg tablet   Yes Historical Provider, MD   fluticasone (CUTIVATE) 0 05 % cream Apply topically daily Rash till resolved 8/19/21  Yes Kesha Upton MD   fluticasone Sylwia Loulou) 50 mcg/act nasal spray fluticasone propionate 50 mcg/actuation nasal spray,suspension 4/3/18  Yes Historical Provider, MD   ibuprofen (MOTRIN) 600 mg tablet  4/6/22  Yes Historical Provider, MD   losartan (COZAAR) 50 mg tablet TAKE 1 TABLET BY MOUTH DAILY 12/19/16  Yes Historical Provider, MD   omeprazole (PriLOSEC) 20 mg delayed release capsule  9/13/16  Yes Historical Provider, MD   Sod Fluoride-Potassium Nitrate 1 1-5 % PSTE PreviDent 5000 Sensitive 1 1 %-5 % dental paste 6/9/15  Yes Historical Provider, MD   Sod Fluoride-Potassium Nitrate 1 1-5 % PSTE PreviDent 5000 Sensitive 1 1 %-5 % dental paste   Yes Historical Provider, MD   amLODIPine (NORVASC) 5 mg tablet amlodipine 5 mg tablet  Patient not taking: No sig reported 4/8/19   Historical Provider, MD   Cholecalciferol (D 1000) 1000 units CHEW Chew  Patient not taking: Reported on 8/19/2021    Historical Provider, MD   conjugated estrogens (PREMARIN) vaginal cream Premarin 0 625 mg/gram vaginal cream 9/26/18   Historical Provider, MD   methocarbamol (ROBAXIN) 500 mg tablet Take 1 tablet by mouth 4 (four) times a day as needed for muscle spasms for up to 10 days  Patient not taking: Reported on 5/7/2019 7/7/17 5/7/19  Joan Veloz MD   naproxen (NAPROSYN) 500 present  Ext: pitting edema present bilateral lower extremities,  Skin: no rashes, lesions, or ulcers noted  Neuro: CN 2-12 grossly intact, normal sensation  and no focal deficits noted  Psych: Confused    TEST RESULTS     Labs: The Laboratory values listed below have been reviewed and pertinent findings discussed in the Assessment and Plan.    Laboratory values:   Recent Labs   Lab 09/26/23  0730 09/25/23  0918 09/24/23  0732   WBC 5.9 6.7 5.5   HGB 7.6* 8.7* 7.8*   HCT 23.8* 28.6* 25.1*    192 161       Recent Labs   Lab 09/26/23  1458 09/26/23  0730 09/25/23  1031 09/25/23  0918   SODIUM  --  140 144 144   POTASSIUM 3.7 3.5 3.7 4.0   CHLORIDE  --  106 107 108   CO2  --  27 31 29   CALCIUM  --  8.6 9.0 9.3   GLUCOSE  --  92 123* 114*   BUN  --  19 18 18   CREATININE  --  1.33* 1.17 1.22*        Recent Labs   Lab 09/26/23  0730 09/25/23  1031 09/25/23  0918   ALBUMIN 1.8* 2.2* 2.3*   AST 20 27 30   GPT 33 43 45   BILIRUBIN 0.8 0.6 0.7     No results found  No results available in last 24 hours      Radiology: Imaging studies have been reviewed and pertinent findings discussed in the Assessment and Plan.     ANCILLARY ORDERS     Diet:  2 Times/Day Between Meals (Am/Pm); Ensure Plus Hp/Standard Oral Supplement, Viola Oral Nutrition Supplement  Sodium 2 Gm (Low Sodium) Diet  Telemetry: Off  Consults:    IP CONSULT TO SOCIAL WORK  IP CONSULT TO GENERAL SURGERY  IP CONSULT TO GI  Therapy Orders:   PT and OT Orders Placed this Encounter   Procedures   • Occupational Therapy   • Physical Therapy       ADVANCED DIRECTIVES     Code Status: Full Resuscitation             ASSESSMENT AND PLAN       # advanced dementia  Delirium, hyperactive  Inability to self-care  Generalized weakness  CMP grossly unremarkable except slightly elevated creatinine, B12 within normal limit, ferritin elevated, folate within normal limit, iron low, % saturation low, total binding capacity low, vitamin-D recently was low  TSH within  mg tablet Take 1 tablet by mouth 2 (two) times a day with meals  Patient not taking: Reported on 5/7/2019 7/7/17 7/7/18  Sarahi Urban MD   sertraline (ZOLOFT) 25 mg tablet sertraline 25 mg tablet 4/29/21   Historical Provider, MD       Subjective:     Review of Systems:    General: negative for - chills, fatigue, fever,  weight gain or weight loss  Psychological: negative for - anxiety, behavioral disorder, concentration difficulties, decreased libido, depression, irritability, memory difficulties, mood swings, sleep disturbances or suicidal ideation  ENT: negative for - hearing difficulties , nasal congestion, nasal discharge, oral lesions, sinus pain, sneezing, sore throat  Allergy and Immunology: negative for - hives, insect bite sensitivity,  Hematological and Lymphatic: negative for - bleeding problems, blood clots,bruising, swollen lymph nodes  Endocrine: negative for - hair pattern changes, hot flashes, malaise/lethargy, mood swings, palpitations, polydipsia/polyuria, skin changes, temperature intolerance or unexpected weight change  Respiratory: negative for - cough, hemoptysis, orthopnea, shortness of breath, or wheezing  Cardiovascular: negative for - chest pain, dyspnea on exertion, edema,  Gastrointestinal: negative for - abdominal pain, nausea/vomiting  Genito-Urinary: negative for - dysuria, incontinence, irregular/heavy menses or urinary frequency/urgency  Musculoskeletal: negative for - gait disturbance, joint pain, joint stiffness, joint swelling, muscle pain, muscular weakness  Dermatological:  As in HPI  Neurological: negative for confusion, dizziness, headaches, impaired coordination/balance, memory loss, numbness/tingling, seizures, speech problems, tremors or weakness       Objective:   Ht 5' 3" (1 6 m)   Wt 61 2 kg (135 lb)   BMI 23 91 kg/m²     Physical Exam:    General Appearance:    Alert, cooperative, no distress   Head:    Normocephalic, without obvious abnormality, atraumatic normal limit  Urinalysis unremarkable urine protein creatinine ratio showed sub nephrotic range proteinuria, urine culture has grown Proteus mirabilis (pansensitive)--treated  Urine drug screening unremarkable  Chest x-ray unremarkable for acute finding, CT head without contrast unremarkable for acute intracranial finding  Patient has variable appetite, he has regular bowel movement  Plan continue p.r.n. Tylenol for pain, continue melatonin, continue gabapentin, delirium precaution, continue sitter, continue stool softener,     # acute cholecystitis  CT scan suggestive of cholelithiasis  HIDA scan suggestive of acute cholecystitis  GI General surgery consulted  Plan continue IV antibiotics( day 3 on zosyn)     # bilateral lower limb swelling  Stage III CKD  Creatinine close to baseline  Echo showed left ventricular ejection fraction of 60% with grade 1 diastolic dysfunction  Plan avoid nephrotoxin, monitor BMP  Plan continue metoprolol, continue statin    # history of alcohol use disorder   No alcohol withdrawal symptoms at this time    # normocytic anemia  Anemia of chronic disease  H&H stable, no active sign of bleeding   Ferritin elevated, iron low, total binding capacity low  Plan monitor CBC    # history of gout  Left Knee pain , possible acute gout attack improving  Uric acid within normal limit  Left knee x-ray showed osteoarthritic change and effusion, joint aspiration and synovial fluid analysis showed intra and extracellular uric acid crystals, culture showed no growth  Completed short course of steroid  XR joint 9/27 showed Mild medial compartment osteophytes indicates mild osteoarthritis. mild joint effusion  Plan continue allopurinol,    09/28 called  POA, not answering phone  Smoking status:      Nutrition status: appropriate  Body mass index is 22.42 kg/m². - Patient has an appropriate weight with BMI 18.5-24  DVT Prophylaxis: Lovenox prophylactic dosing (dose adjusted as per renal function)          DISCHARGE PLANNING     The patient's treatment plans were discussed with patient and RN.    Discharge Planning    Barriers to discharge: Patient is not medically ready and needs to remain in the hospital today due to Delirium  Anticipated discharge destination: Sub-Acute  Expected Discharge Date: 10/2/2023                Ulisses Milner MD  Hospitalist  9/28/2023  10:29 AM   Skin:   A full skin exam was performed including scalp, head scalp, eyes, ears, nose, lips, neck, chest, axilla, abdomen, back, buttocks, bilateral upper extremities, bilateral lower extremities, hands, feet, fingers, toes, fingernails, and toenails slight scaling erythema noted around the mouth mostly on the right corner of the mouth no papules pustules noted scaling hyperkeratotic area noted on the right lower leg normal keratotic papules with greasy stuck on appearance nothing else atypical noted on exam     Assessment:     1  Perioral dermatitis     2  Lichen simplex chronicus     3  Seborrheic keratosis     4  Screening for skin condition           Plan:   PERIORIFICIAL DERMATITIS      What is periorificial dermatitis? Periorificial dermatitis is a common facial skin problem characterized by groups of itchy or tender small red bumps  It is given this name because the bumps occur around the eyes, the nostrils, the mouth and occasionally, the genitals  The more restrictive term, perioral dermatitis, is often used when the eruption is confined to the skin in the lower half of the face, particularly around the mouth  Periocular dermatitis may be used to describe the rash affecting the eyelids  Periorificial dermatitis mainly affects adult women aged 13 to 39 years  It is less common in men  It can also affect children of any age  What is the cause of periorificial or periorificial dermatitis? The exact cause of periorificial is not understood  Periorificial dermatitis may be related to:   Skin barrier dysfunction   Activation of the body's immune system   Altered bacterial levels on the skin   Bacteria from hair follicles    Unlike seborrheic dermatitis which can affect similar areas of the face, malassezia yeasts are not involved in periorificial dermatitis    Periorificial dermatitis may be directly caused by:   Topical steroids, whether applied deliberately to facial skin or inadvertently   Nasal steroids, steroid inhalers, and oral steroids   Cosmetic creams, make-ups and sunscreens   Fluorinated toothpaste   Neglecting to wash the face   Hormonal changes and/or oral contraceptives    What are the symptoms of periorificial dermatitis? The characteristics of facial periorificial dermatitis are:   Eruption on the chin, upper lip and eyelids    Sparing of the skin bordering the lips (which then appears pale), eyelids, nostrils   Clusters of 1-2 mm red bumps, potentially with pus   Dry and flaky skin surface   Burning irritation    In contrast to steroid-induced rosacea, periorificial dermatitis spares the cheeks and forehead  Genital periorificial dermatitis has a similar clinical appearance  It involves the skin on and around labia majora (in females), scrotum (in males) and anus  Granulomatous periorificial dermatitis is a variant of periorificial dermatitis that presents with persistent yellowish bumps  It occurs mainly in young children and nearly always follows the use of a corticosteroid  Rebound flare of severe periorificial dermatitis may occur after abrupt cessation of application of potent topical steroid to facial skin  The presentation of periorificial dermatitis is usually typical, so diagnosis can be made by history and skin exam  There are no specific tests  Skin biopsy may occasionally be taken, and show similar findings to rosacea  Periorificial dermatitis responds well to treatment, although it may take several weeks before there is a noticeable improvement  General measures   Discontinue applying all face creams including topical steroids, cosmetics and sunscreens (zero therapy)   Consider a slower withdrawal from topical steroid/face creams if there is a severe flare after steroid cessation  Temporarily, replace it by a less potent or less occlusive cream or apply it less and less frequently until it is no longer required     Wash the face with warm water alone while the rash is present  When it has cleared up, use a non-soap bar or liquid cleanser if you wish   Choose a liquid or gel sunscreen  Topical therapy: used to treat mild periorificial dermatitis  Choices include:   Erythromycin   Clindamycin   Metronidazole   Pimecrolimus   Azelaic acid    Oral therapy: in more severe cases, a course of oral antibiotics may be prescribed for 6-12 weeks   Most often, a tetracycline such as doxycycline is recommended  A sub-antimicrobial dose may be sufficient   Oral erythromycin is used during pregnancy and in pre-pubertal children   Oral low-dose isotretinoin may be used if antibiotics are ineffective or contraindicated  Periorificial dermatitis can generally be prevented by the avoidance of topical steroids and occlusive face creams  When topical steroids are necessary to treat an inflammatory facial rash, they should be applied accurately to the affected area, no more than once daily in the lowest effective potency, and discontinued as soon as the rash responds  Periorificial dermatitis sometimes recurs when the antibiotics are discontinued, or at a later date  The same treatment can be used again  Lichen simplex chronicus patient probably get irritated this for rubbing it on occasion on the leg advised to try to keep her hands off of it this see if it will settle down   Seborrheic Keratosis  Patient reasurred these are normal growths we acquire with age no treatment needed  Screening for Dermatologic Disorders: Nothing else of concern noted on complete exam follow up in 1 year           Janneth Morrell MD  9/1/2022,3:53 PM    Portions of the record may have been created with voice recognition software   Occasional wrong word or "sound a like" substitutions may have occurred due to the inherent limitations of voice recognition software   Read the chart carefully and recognize, using context, where substitutions have occurred

## 2023-10-24 ENCOUNTER — PROCEDURE VISIT (OUTPATIENT)
Age: 73
End: 2023-10-24
Payer: COMMERCIAL

## 2023-10-24 VITALS — BODY MASS INDEX: 23.57 KG/M2 | HEIGHT: 63 IN | WEIGHT: 133 LBS

## 2023-10-24 DIAGNOSIS — D09.9 SQUAMOUS CELL CARCINOMA IN SITU: Primary | ICD-10-CM

## 2023-10-24 PROCEDURE — 17261 DSTRJ MAL LES T/A/L .6-1.0CM: CPT | Performed by: DERMATOLOGY

## 2023-10-24 NOTE — PROGRESS NOTES
[facility] Note     Patient Name: Darline Mora  Encounter Date: 10/24/2023     Have you been cared for by a [facility] in the last 3 years and, if so, which description applies to you? Yes. I have been here within the last 3 years, and my medical history has NOT changed since that time. I am FEMALE/of child-bearing potential.    REVIEW OF SYSTEMS:  Have you recently had or currently have any of the following? No changes in my recent health. PAST MEDICAL HISTORY:  Have you personally ever had or currently have any of the following? If "YES," then please provide more detail. No changes in my medical history. HISTORY OF IMMUNOSUPPRESSION: Do you have a history of any of the following:  Systemic Immunosuppression such as Diabetes, Biologic or Immunotherapy, Chemotherapy, Organ Transplantation, Bone Marrow Transplantation? No     Answering "YES" requires the addition of the dotphrase "IMMUNOSUPPRESSED" as the first diagnosis of the patient's visit. FAMILY HISTORY:  Any "first degree relatives" (parent, brother, sister, or child) with the following? No changes in my family's known health. PATIENT EXPERIENCE:    Do you want the Dermatologist to perform a COMPLETE skin exam today including a clinical examination under the "bra and underwear" areas? NO  If necessary, do we have your permission to call and leave a detailed message on your Preferred Phone number that includes your specific medical information? Yes      Allergies   Allergen Reactions    Lisinopril      Other reaction(s): Cough    Penicillins Hives    Penicillin G      Other reaction(s):  Other (Please comment)  Pt was young when she had reaction, does not recall what type of reaction she had      Current Outpatient Medications:     aspirin (ECOTRIN LOW STRENGTH) 81 mg EC tablet, Take 81 mg by mouth (Patient not taking: Reported on 9/7/2023), Disp: , Rfl:     atorvastatin (LIPITOR) 40 mg tablet, ONE PILL BY MOUTH ONCE A DAY. PLEASE CALL FOR OFFICE VISIT., Disp: , Rfl:     buPROPion (Wellbutrin XL) 300 mg 24 hr tablet, , Disp: , Rfl:     desonide (DESOWEN) 0.05 % ointment, APPLY SPARINGLY TO AFFECTED AREA(S) TWICE DAILY, Disp: 15 g, Rfl: 1    econazole nitrate 1 % cream, econazole 1 % topical cream, Disp: , Rfl:     fluticasone (FLONASE) 50 mcg/act nasal spray, fluticasone propionate 50 mcg/actuation nasal spray,suspension, Disp: , Rfl:     ibuprofen (MOTRIN) 600 mg tablet, , Disp: , Rfl:     losartan (COZAAR) 100 MG tablet, , Disp: , Rfl:     losartan (COZAAR) 50 mg tablet, TAKE 1 TABLET BY MOUTH DAILY, Disp: , Rfl:     methocarbamol (ROBAXIN) 500 mg tablet, Take 1 tablet by mouth 4 (four) times a day as needed for muscle spasms for up to 10 days (Patient not taking: Reported on 5/7/2019), Disp: 20 tablet, Rfl: 0    naproxen (NAPROSYN) 500 mg tablet, Take 1 tablet by mouth 2 (two) times a day with meals (Patient not taking: Reported on 5/7/2019), Disp: 20 tablet, Rfl: 0    omeprazole (PriLOSEC) 20 mg delayed release capsule, , Disp: , Rfl:     pimecrolimus (ELIDEL) 1 % cream, Apply topically 2 (two) times a day To rash around mouth until resolved, Disp: 30 g, Rfl: 1    sertraline (ZOLOFT) 25 mg tablet, sertraline 25 mg tablet, Disp: , Rfl:     Sod Fluoride-Potassium Nitrate 1.1-5 % PSTE, PreviDent 5000 Sensitive 1.1 %-5 % dental paste, Disp: , Rfl:     Sod Fluoride-Potassium Nitrate 1.1-5 % PSTE, PreviDent 5000 Sensitive 1.1 %-5 % dental paste, Disp: , Rfl:           Whom besides the patient is providing clinical information about today's encounter? NO ADDITIONAL HISTORIAN (patient alone provided history)    75year old female present for an excision in left lower leg-squamous cell carcinoma.      Physical Exam and Assessment/Plan by Diagnosis:    CURETTAGE AND DESICCATION Malignant and Premalignant Lesion    Diagnosis: squamous cell carcinoma in situ  Prior biopsy: Yes  Access Number:  (Previous Accession Number: [id number])    Location left lower leg  Size preop 7 mm  Size postop 8 mm    Additional History of Present Condition:  present for a while. Assessment and Plan:  Based on a thorough discussion of this condition and the management approach to it (including a comprehensive discussion of the known risks, side effects and potential benefits of treatment), the patient (family) agrees to treat the above described lesion with desiccation and curettage. Procedure: The area was cleanly  prepped in usual manner  Anesthesia:1% xylocaine with epi    The above described lesion was aggressively curetted to mid dermis followed by desiccation  Number of cycles of desiccation and curettage 3  A clean dry dressing was placed on site. Oral and written postop care instructions were discussed and reviewed      DISCUSSION OF TREATMENT AND POSTOP CARE FOR PATIENT    What is curettage and desiccation? Curettage and desiccation is a type of electrosurgery in which a skin lesion is scraped off and heat is applied to the skin surface. What is involved in curettage and cautery? Your doctor will explain to you why your skin lesion needs treatment and the procedure involved. You may have to sign a consent form to indicate that you consent to the surgical procedure. Tell your doctor if you are taking any medication, or if you have any allergies or medical conditions. The doctor will inject some local anaesthetic into the area surrounding the lesion to be treated. This will make the skin go numb so no pain should be felt during the procedure. You may feel a pushing sensation but this should not be painful. The skin lesion is scraped off with a curette, which is like a small spoon with very sharp edges. The lesion should be sent to a pathology laboratory for analysis. The wound surface is then cauterised with a hot wire beaded tip or electrosurgical unit (diathermy).  This stops bleeding and helps destroys any remaining skin tumour cells.  This procedure is usually repeated twice for malignant skin lesions (serial curettage and cautery). A dressing may be applied and instructions should be given on how to care for your wound. What types of skin lesions can be treated by curettage? Curettage is suitable to treat lesions where the material being scraped off is softer than the surrounding skin or when there is a natural cleavage plane between the lesion and the surrounding normal tissue. The following are sometimes treated by curettage:  Squamous cell carcinoma in situ   Actinic keratoses   Basal cell carcinomas that are large, deep or recurrent are usually not suitable for curettage. Lesions with poorly defined edges are also generally unsuitable. Curettage and desiccation is most often used in more superficial type basal cell carcinoma. Will I have a scar? Curettage often results in some sort of scar especially if accompanied by cautery. The scars from curettage are usually flat and round. They are a similar size to that of the original skin lesion. Some people have an abnormal response to skin healing and these people may get larger scars than usual (keloids and hypertrophic scarring). How do I look after the wound following skin curettage? The wound may be tender when the local anaesthetic wears off. Leave the dressing in place till the nest day. Avoid strenuous exertion and stretching of the area. If there is any bleeding, press on the wound firmly with a folded towel without looking at it for 30 minutes. If it is still bleeding after this time, seek medical attention. Follow instructions a written in our consent ,  The wound from curettage will take approximately 2-3 weeks to heal over. The scar will initially be red and raised but usually reduces in colour and size over several months.       Scribe Attestation      I,:  Liborio De La Torre am acting as a scribe while in the presence of the attending physician.:       I,:  Diane Rios MD [facility] personally performed the services described in this documentation    as scribed in my presence.:

## 2023-10-24 NOTE — PATIENT INSTRUCTIONS
DISCUSSION OF TREATMENT AND POSTOP CARE FOR PATIENT    What is curettage and desiccation? Curettage and desiccation is a type of electrosurgery in which a skin lesion is scraped off and heat is applied to the skin surface. What is involved in curettage and cautery? Your doctor will explain to you why your skin lesion needs treatment and the procedure involved. You may have to sign a consent form to indicate that you consent to the surgical procedure. Tell your doctor if you are taking any medication, or if you have any allergies or medical conditions. The doctor will inject some local anaesthetic into the area surrounding the lesion to be treated. This will make the skin go numb so no pain should be felt during the procedure. You may feel a pushing sensation but this should not be painful. The skin lesion is scraped off with a curette, which is like a small spoon with very sharp edges. The lesion should be sent to a pathology laboratory for analysis. The wound surface is then cauterised with a hot wire beaded tip or electrosurgical unit (diathermy). This stops bleeding and helps destroys any remaining skin tumour cells. This procedure is usually repeated twice for malignant skin lesions (serial curettage and cautery). A dressing may be applied and instructions should be given on how to care for your wound. What types of skin lesions can be treated by curettage? Curettage is suitable to treat lesions where the material being scraped off is softer than the surrounding skin or when there is a natural cleavage plane between the lesion and the surrounding normal tissue. The following are sometimes treated by curettage:  Squamous cell carcinoma in situ   Actinic keratoses   Basal cell carcinomas that are large, deep or recurrent are usually not suitable for curettage. Lesions with poorly defined edges are also generally unsuitable. Curettage and desiccation is most often used in more superficial type basal cell carcinoma. Will I have a scar? Curettage often results in some sort of scar especially if accompanied by cautery. The scars from curettage are usually flat and round. They are a similar size to that of the original skin lesion. Some people have an abnormal response to skin healing and these people may get larger scars than usual (keloids and hypertrophic scarring). How do I look after the wound following skin curettage? The wound may be tender when the local anaesthetic wears off. Leave the dressing in place till the nest day. Avoid strenuous exertion and stretching of the area. If there is any bleeding, press on the wound firmly with a folded towel without looking at it for 30 minutes. If it is still bleeding after this time, seek medical attention. Follow instructions a written in our consent ,  The wound from curettage will take approximately 2-3 weeks to heal over. The scar will initially be red and raised but usually reduces in colour and size over several months. Jayshree Aguilar

## 2024-02-21 PROBLEM — Z13.89 SCREENING FOR SKIN CONDITION: Status: RESOLVED | Noted: 2018-04-13 | Resolved: 2024-02-21

## 2024-04-30 ENCOUNTER — OFFICE VISIT (OUTPATIENT)
Age: 74
End: 2024-04-30
Payer: COMMERCIAL

## 2024-04-30 DIAGNOSIS — D18.01 CHERRY ANGIOMA: ICD-10-CM

## 2024-04-30 DIAGNOSIS — Z13.89 SCREENING FOR SKIN CONDITION: Primary | ICD-10-CM

## 2024-04-30 DIAGNOSIS — D22.9 NEVUS: ICD-10-CM

## 2024-04-30 DIAGNOSIS — L82.1 SEBORRHEIC KERATOSIS: ICD-10-CM

## 2024-04-30 DIAGNOSIS — Z85.828 HISTORY OF SKIN CANCER: ICD-10-CM

## 2024-04-30 PROCEDURE — 99213 OFFICE O/P EST LOW 20 MIN: CPT | Performed by: DERMATOLOGY

## 2024-04-30 NOTE — PROGRESS NOTES
"[facility] Note     Patient Name: Kylie Fritz  Encounter Date: 04/30/2024     Have you been cared for by a [facility] Dermatologist in the last 3 years and, if so, which description applies to you?    Yes.  I have been here within the last 3 years, and my medical history has NOT changed since that time.  I am FEMALE/of child-bearing potential.    REVIEW OF SYSTEMS:  Have you recently had or currently have any of the following? No changes in my recent health.   PAST MEDICAL HISTORY:  Have you personally ever had or currently have any of the following?  If \"YES,\" then please provide more detail. No changes in my medical history.   HISTORY OF IMMUNOSUPPRESSION: Do you have a history of any of the following:  Systemic Immunosuppression such as Diabetes, Biologic or Immunotherapy, Chemotherapy, Organ Transplantation, Bone Marrow Transplantation?  No     Answering \"YES\" requires the addition of the dotphrase \"IMMUNOSUPPRESSED\" as the first diagnosis of the patient's visit.   FAMILY HISTORY:  Any \"first degree relatives\" (parent, brother, sister, or child) with the following?    No changes in my family's known health.   PATIENT EXPERIENCE:    Do you want the Dermatologist to perform a COMPLETE skin exam today including a clinical examination under the \"bra and underwear\" areas?  Yes  If necessary, do we have your permission to call and leave a detailed message on your Preferred Phone number that includes your specific medical information?  Yes      Allergies   Allergen Reactions    Lisinopril      Other reaction(s): Cough    Penicillins Hives    Penicillin G      Other reaction(s): Other (Please comment)  Pt was young when she had reaction, does not recall what type of reaction she had      Current Outpatient Medications:     aspirin (ECOTRIN LOW STRENGTH) 81 mg EC tablet, Take 81 mg by mouth, Disp: , Rfl:     atorvastatin (LIPITOR) 40 mg tablet, ONE PILL BY MOUTH ONCE A DAY. PLEASE CALL FOR OFFICE " VISIT., Disp: , Rfl:     buPROPion (Wellbutrin XL) 300 mg 24 hr tablet, , Disp: , Rfl:     desonide (DESOWEN) 0.05 % ointment, APPLY SPARINGLY TO AFFECTED AREA(S) TWICE DAILY, Disp: 15 g, Rfl: 1    econazole nitrate 1 % cream, econazole 1 % topical cream, Disp: , Rfl:     fluticasone (FLONASE) 50 mcg/act nasal spray, fluticasone propionate 50 mcg/actuation nasal spray,suspension, Disp: , Rfl:     ibuprofen (MOTRIN) 600 mg tablet, , Disp: , Rfl:     losartan (COZAAR) 100 MG tablet, , Disp: , Rfl:     losartan (COZAAR) 50 mg tablet, TAKE 1 TABLET BY MOUTH DAILY, Disp: , Rfl:     methocarbamol (ROBAXIN) 500 mg tablet, Take 1 tablet by mouth 4 (four) times a day as needed for muscle spasms for up to 10 days (Patient not taking: Reported on 5/7/2019), Disp: 20 tablet, Rfl: 0    naproxen (NAPROSYN) 500 mg tablet, Take 1 tablet by mouth 2 (two) times a day with meals (Patient not taking: Reported on 5/7/2019), Disp: 20 tablet, Rfl: 0    omeprazole (PriLOSEC) 20 mg delayed release capsule, , Disp: , Rfl:     pimecrolimus (ELIDEL) 1 % cream, Apply topically 2 (two) times a day To rash around mouth until resolved, Disp: 30 g, Rfl: 1    sertraline (ZOLOFT) 25 mg tablet, sertraline 25 mg tablet, Disp: , Rfl:     Sod Fluoride-Potassium Nitrate 1.1-5 % PSTE, PreviDent 5000 Sensitive 1.1 %-5 % dental paste, Disp: , Rfl:     Sod Fluoride-Potassium Nitrate 1.1-5 % PSTE, PreviDent 5000 Sensitive 1.1 %-5 % dental paste, Disp: , Rfl:           Whom besides the patient is providing clinical information about today's encounter?   NO ADDITIONAL HISTORIAN (patient alone provided history)    Physical Exam and Assessment/Plan by Diagnosis:       Chief Complaint   Patient presents with    Follow-up     Skin exam, spot of concern scatter around her body.    HISTORY OF SQUAMOUS CELL CARCINOMA     Physical Exam:  Anatomic Location Affected:  left lower leg 09/07/2023  Morphological Description of Scar:  well healed   Suspected Recurrence:  "no  Regional adenopathy: no    Additional History of Present Condition:  present on exam     Assessment and Plan:  Based on a thorough discussion of this condition and the management approach to it (including a comprehensive discussion of the known risks, side effects and potential benefits of treatment), the patient (family) agrees to implement the following specific plan:  Continue to monitor        MELANOCYTIC NEVI (\"Moles\")    Physical Exam:  Anatomic Location Affected: Mostly on sun-exposed areas of the body.  Morphological Description:  Scattered, 1-4mm round to ovoid, symmetrical-appearing, even bordered, skin colored to dark brown macules/papules, mostly in sun-exposed areas  Pertinent Positives:  Pertinent Negatives:    Additional History of Present Condition:  present on exam     Assessment and Plan:  Based on a thorough discussion of this condition and the management approach to it (including a comprehensive discussion of the known risks, side effects and potential benefits of treatment), the patient (family) agrees to implement the following specific plan:  Provided handout with information regarding the ABCDE's of moles   Recommend routine skin exams every 6 month   Sun avoidance, protective clothing (known as UPF clothing), and the use of at least SPF 30 sunscreens is advised. Sunscreen should be reapplied every two hours when outside.       SEBORRHEIC KERATOSIS; NON-INFLAMED    Physical Exam:  Anatomic Location Affected:  scattered across trunk, extremities,  face  Morphological Description:  Flat and raised, waxy, smooth to warty textured, yellow to brownish-grey to dark brown to blackish, discrete, \"stuck-on\" appearing papules.  Pertinent Positives:  Pertinent Negatives:    Additional History of Present Condition:  Patient reports new bumps on the skin.  Denies itch, burn, pain, bleeding or ulceration.  Present constantly; nothing seems to make it worse or better.  No prior treatment.  " "    Assessment and Plan:  Based on a thorough discussion of this condition and the management approach to it (including a comprehensive discussion of the known risks, side effects and potential benefits of treatment), the patient (family) agrees to implement the following specific plan:  Reassured benign        ANGIOMA (\"CHERRY ANGIOMA\")    Physical Exam:  Anatomic Location: scattered across sun exposed areas of the trunk and extremities   Morphologic Description: Firm red to reddish-blue discrete papules  Pertinent Positives:  Pertinent Negatives:    Additional History of Present Condition:  Present on exam.    Assessment and Plan:  Reassured benign           Scribe Attestation      I,:  Michelet Schroeder am acting as a scribe while in the presence of the attending physician.:       I,:  Mark Pino MD personally performed the services described in this documentation    as scribed in my presence.:            "

## 2024-04-30 NOTE — PATIENT INSTRUCTIONS
ACTINIC KERATOSIS    Actinic keratoses are very common on sites repeatedly exposed to the sun, especially the backs of the hands and the face, most often affecting the ears, nose, cheeks, upper lip, vermilion of the lower lip, temples, forehead and balding scalp. In severely chronically sun-damaged individuals, they may also be found on the upper trunk, upper and lower limbs, and dorsum of feet.    We discussed the theoretical premalignant (“pre-cancerous”) nature and etiology of these growths.  We discussed the prevailing notion that actinic keratoses are a reflection of abnormal skin cell development due to DNA damage by short wavelength UVB.  They are more likely to appear if the immune function is poor, due to aging, recent sun exposure, predisposing disease or certain drugs.    We discussed that the main concern is that actinic keratoses may predispose to squamous cell carcinoma. It is rare for a solitary actinic keratosis to evolve to squamous cell carcinoma (SCC), but the risk of SCC occurring at some stage in a patient with more than 10 actinic keratoses is thought to be about 10 to 15%. A tender, thickened, ulcerated or enlarging actinic keratosis is suspicious of SCC.    Actinic keratoses may be prevented by strict sun protection. If already present, keratoses may improve with a very high sun protection factor (50+) broad-spectrum sunscreen applied at least daily to affected areas, year-round.  We recommend that UPF-rated clothing and hats and sunglasses be worn whenever possible and that a sunscreen-moisturizer combination product such as Neutrogena Daily Defense be applied at least three times a day.    We performed a thorough discussion of treatment options and specific risk/benefits/alternatives including but not limited to medical “field” treatment with medications such as the following:    Topical “field area” medications such as 5-fluorouracil or Aldara (specifically, the trouble with long-term  compliance, blistering and local skin reaction versus the convenience of at-home therapy and that field therapy “gets what is not yet seen”).    Cryotherapy (specifically, local pain, scarring, dyspigmentation, blistering, possible superinfection, and treats “only what we see” versus directed treatment today).    Photodynamic therapy (specifically, local pain, scarring, dyspigmentation, blistering, possible superinfection, need to schedule for a later date, and time spent in the office versus field therapy that “gets what is not yet seen”).      BASAL CELL CARCINOMA    What is basal cell carcinoma?  Basal cell carcinoma (BCC) is a common, locally invasive, keratinocytic, or non-melanoma, skin cancer. It is also known as rodent ulcer and basalioma. Patients with BCC often develop multiple primary tumours over time.    Who gets basal cell carcinoma?  Risk factors for BCC include:  Age and sex: BCCs are particularly prevalent in elderly males. However, they also affect females and younger adults   Previous BCC or other form of skin cancer (squamous cell carcinoma, melanoma)   Sun damage (photoaging, actinic keratoses)   Repeated prior episodes of sunburn   Fair skin, blue eyes and blond or red hair--note; BCC can also affect darker skin types   Previous cutaneous injury, thermal burn, disease (eg cutaneous lupus, sebaceous naevus)   Inherited syndromes: BCC is a particular problem for families with basal cell naevus syndrome (Gorlin syndrome), Efodi-Tmmhé-Qfrqtdfe syndrome, Rombo syndrome, Oley syndrome and xeroderma pigmentosum   Other risk factors include ionising radiation, exposure to arsenic, and immune suppression due to disease or medicines    What causes basal cell carcinoma?  The cause of BCC is multifactorial.  Most often, there are DNA mutations in the patched (PTCH) tumour suppressor gene, part of hedgehog signaling pathway   These may be triggered by exposure to ultraviolet radiation   Various spontaneous  and inherited gene defects predispose to BCC    What are the clinical features of basal cell carcinoma?  BCC is a locally invasive skin tumour. The main characteristics are:  Slowly growing plaque or nodule   Skin coloured, pink or pigmented   Varies in size from a few millimetres to several centimetres in diameter   Spontaneous bleeding or ulceration  BCC is very rarely a threat to life. A tiny proportion of BCCs grow rapidly, invade deeply, and/or metastasise to local lymph nodes.    Types of basal cell carcinoma  There are several distinct clinical types of BCC, and over 20 histological growth patterns of BCC.  Nodular BCC  Most common type of facial BCC   Shiny or pearly nodule with a smooth surface   May have central depression or ulceration, so its edges appear rolled   Blood vessels cross its surface   Cystic variant is soft, with jelly-like contents   Micronodular, microcystic and infiltrative types are potentially aggressive subtypes   Also known as nodulocystic carcinoma  Superficial BCC  Most common type in younger adults   Most common type on upper trunk and shoulders   Slightly scaly, irregular plaque   Thin, translucent rolled border   Multiple microerosions  Morphoeaform BCC  Usually found in mid-facial sites   Waxy, scar-like plaque with indistinct borders   Wide and deep subclinical extension   May infiltrate cutaneous nerves (perineural spread)   Also known as morpheic, morphoeiform or sclerosing BCC  Basosquamous carcinoma  Mixed basal cell carcinoma (BCC) and squamous cell carcinoma (SCC)   Infiltrative growth pattern   Potentially more aggressive than other forms of BCC   Also known as basisquamous carcinoma and mixed basal-squamous cell carcinoma       Complications of basal cell carcinoma    Recurrent BCC  Recurrence of BCC after initial treatment is not uncommon. Characteristics of recurrent BCC often include:  Incomplete excision or narrow margins at primary excision   Morphoeic,  micronodular, and infiltrative subtypes   Location on head and neck    Advanced BCC  Advanced BCCs are large, often neglected tumours.  They may be several centimetres in diameter   They may be deeply infiltrating into tissues below the skin   They are difficult or impossible to treat surgically    Metastatic BCC  Very rare   Primary tumour is often large, neglected or recurrent, located on head and neck, with aggressive subtype   May have had multiple prior treatments   May arise in site exposed to ionising radiation   Can be fatal    How is basal cell carcinoma diagnosed?  BCC is diagnosed clinically by the presence of a slowly enlarging skin lesion with typical appearance. The diagnosis and  by a diagnostic biopsy or following excision.  Some typical superficial BCCs on trunk and limbs are clinically diagnosed and have non-surgical treatment without histology.    What is the treatment for primary basal cell carcinoma?  The treatment for a BCC depends on its type, size and location, the number to be treated, patient factors, and the preference or expertise of the doctor. Most BCCs are treated surgically. Long-term follow-up is recommended to check for new lesions and recurrence; the latter may be unnecessary if histology has reported wide clear margins.    Excision biopsy  Excision means the lesion is cut out and the skin stitched up.  Most appropriate treatment for nodular, infiltrative and morphoeic BCCs   Should include 3 to 5 mm margin of normal skin around the tumour   Very large lesions may require flap or skin graft to repair the defect   Pathologist will report deep and lateral margins   Further surgery is recommended for lesions that are incompletely excised    Mohs micrographically controlled excision  Mohs micrographically controlled surgery involves examining carefully marked excised tissue under the microscope, layer by layer, to ensure complete excision.  Very high cure rates achieved by trained Mohs  surgeons   Used in high-risk areas of the face around eyes, lips and nose   Suitable for ill-defined, morphoeic, infiltrative and recurrent subtypes   Large defects are repaired by flap or skin graft    Superficial skin surgery  Superficial skin surgery comprises shave, curettage, and electrocautery. It is a rapid technique using local anaesthesia and does not require sutures.  Suitable for small, well-defined nodular or superficial BCCs   Lesions are usually located on trunk or limbs   Wound is left open to heal by secondary intention   Moist wound dressings lead to healing within a few weeks   Eventual scar quality variable    Cryotherapy  Cryotherapy is the treatment of a superficial skin lesion by freezing it, usually with liquid nitrogen.  Suitable for small superficial BCCs on covered areas of trunk and limbs   Best avoided for BCCs on head and neck, and distal to knees   Double freeze-thaw technique   Results in a blister that crusts over and heals within several weeks.   Leaves permanent white raleigh    Photodynamic therapy  Photodynamic therapy (PDT) refers to a technique in which BCC is treated with a photosensitising chemical, and exposed to light several hours later.  Topical photosensitisers include aminolevulinic acid lotion and methyl aminolevulinate cream   Suitable for low-risk small, superficial BCCs   Best avoided if tumour in site at high risk of recurrence   Results in inflammatory reaction, maximal 3-4 days after procedure   Treatment repeated 7 days after initial treatment   Excellent cosmetic results    Imiquimod cream  Imiquimod is an immune response modifier.  Best used for superficial BCCs less than 2 cm diameter   Applied three to five times each week, for 6-16 weeks   Results in a variable inflammatory reaction, maximal at three weeks   Minimal scarring is usual    Fluorouracil cream  5-Fluorouracil cream is a topical cytotoxic agent.  Used to treat small superficial basal cell carcinomas    Requires prolonged course, eg twice daily for 6-12 weeks   Causes inflammatory reaction   Has high recurrence rates    Radiotherapy  Radiotherapy or X-ray treatment can be used to treat primary BCCs or as adjunctive treatment if margins are incomplete.  Mainly used if surgery is not suitable   Best avoided in young patients and in genetic conditions predisposing to skin cancer   Best cosmetic results achieved using multiple fractions   Typically, patient attends once-weekly for several weeks   Causes inflammatory reaction followed by scar   Risk of radiodermatitis, late recurrence, and new tumours    What is the treatment for advanced or metastatic basal cell carcinoma?  Locally advanced primary, recurrent or metastatic BCC requires multidisciplinary consultation. Often a combination of treatments is used.  Surgery   Radiotherapy   Targeted therapy  Targeted therapy refers to the hedgehog signalling pathway inhibitors, vismodegib and sonidegib. These drugs have some important risks and side effects.    How can basal cell carcinoma be prevented?  The most important way to prevent BCC is to avoid sunburn. This is especially important in childhood and early life. Fair skinned individuals and those with a personal or family history of BCC should protect their skin from sun exposure daily, year-round and lifelong.  Stay indoors or under the shade in the middle of the day   Wear covering clothing   Apply high protection factor SPF50+ broad-spectrum sunscreens generously to exposed skin if outdoors   Avoid indoor tanning (sun beds, solaria)  Oral nicotinamide (vitamin B3) in a dose of 500 mg twice daily may reduce the number and severity of BCCs.    What is the outlook for basal cell carcinoma?  Most BCCs are cured by treatment. Cure is most likely if treatment is undertaken when the lesion is small.  About 50% of people with BCC develop a second one within 3 years of the first. They are also at increased risk of other  skin cancers, especially melanoma. Regular self-skin examinations and long-term annual skin checks by an experienced health professional are recommended.    SQUAMOUS CELL CARCINOMA    What is cutaneous squamous cell carcinoma?  Cutaneous squamous cell carcinoma (SCC) is a common type of keratinocyte or non-melanoma skin cancer. It is derived from cells within the epidermis that make keratin -- the horny protein that makes up skin, hair and nails.  Cutaneous SCC is an invasive disease, referring to cancer cells that have grown beyond the epidermis. SCC can sometimes metastasise and may prove fatal.  Intraepidermal carcinoma (cutaneous SCC in situ) and mucosal SCC are considered elsewhere.    Who gets cutaneous squamous cell carcinoma?  Risk factors for cutaneous SCC include:  Age and sex: SCCs are particularly prevalent in elderly males. However, they also affect females and younger adults.   Previous SCC or another form of skin cancer (basal cell carcinoma, melanoma) are a strong predictor for further skin cancers.   Actinic keratoses   Outdoor occupation or recreation   Smoking   Fair skin, blue eyes and blond or red hair   Previous cutaneous injury, thermal burn, disease (eg cutaneous lupus, epidermolysis bullosa, leg ulcer)   Inherited syndromes: SCC is a particular problem for families with xeroderma pigmentosum and albinism   Other risk factors include ionising radiation, exposure to arsenic, and immune suppression due to disease (eg chronic lymphocytic leukaemia) or medicines. Organ transplant recipients have a massively increased risk of developing SCC.    What causes cutaneous squamous cell carcinoma?  More than 90% of cases of SCC are associated with numerous DNA mutations in multiple somatic genes. Mutations in the p53 tumour suppressor gene are caused by exposure to ultraviolet radiation (UV), especially UVB (known as signature 7). Other signature mutations relate to cigarette smoking, ageing and immune  suppression (eg, to drugs such as azathioprine). Mutations in signalling pathways affect the epidermal growth factor receptor, ARIA, Fyn, and [id number] signalling.   Beta-genus human papillomaviruses (wart virus) are thought to play a role in SCC arising in immune-suppressed populations. ?-HPV and HPV subtypes 5, 8, 17, 20, 24, and 38 have also been associated with an increased risk of cutaneous SCC in immunocompetent individuals.     What are the clinical features of cutaneous squamous cell carcinoma?  Cutaneous SCCs present as enlarging scaly or crusted lumps. They usually arise within pre-existing actinic keratosis or intraepidermal carcinoma.  They grow over weeks to months   They may ulcerate   They are often tender or painful   Located on sun-exposed sites, particularly the face, lips, ears, hands, forearms and lower legs   Size varies from a few millimetres to several centimetres in diameter.    Types of cutaneous squamous cell carcinoma  Distinct clinical types of invasive cutaneous SCC include:  Cutaneous horn -- the horn is due to excessive production of keratin   Keratoacanthoma (KA) -- a rapidly growing keratinising nodule that may resolve without treatment   Carcinoma cuniculatum (‘verrucous carcinoma’), a slow-growing, warty tumour on the sole of the foot.   Multiple eruptive SCC/KA-like lesions arising in syndromes, such as multiple self-healing squamous epitheliomas of Mcdermott-Smith and Grzybowski syndrome  The pathologist may classify a tumour as well differentiated, moderately well differentiated, poorly differentiated or anaplastic cutaneous SCC. There are other variants.    Classification of squamous cell carcinoma by risk  Cutaneous SCC is classified as low-risk or high-risk, depending on the chance of tumour recurrence and metastasis. Characteristics of high-risk SCC include:  High-risk cutaneous squamous cell carcinoma has the following characteristics:  Diameter greater than or equal to 2 cm    Location on the ear, vermilion of the lip, central face, hands, feet, genitalia   Arising in elderly or immune suppressed patient   Histological thickness greater than 2 mm, poorly differentiated histology, or with the invasion of the subcutaneous tissue, nerves and blood vessels  Metastatic SCC is found in regional lymph nodes (80%), lungs, liver, brain, bones and skin.    Staging cutaneous squamous cell carcinoma  In 2011, the American Joint Committee on Cancer (AJCC) published a new staging systemic for cutaneous SCC for the 7th Edition of the AJCC manual. This evaluates the dimensions of the original primary tumour (T) and its metastases to lymph nodes (N).    Tumour staging for cutaneous SCC  TX: Th Primary tumour cannot be assessed  T0: No evidence of a primary tumour  Tis: Carcinoma in situ  T1: Tumour ? 2cm without high-risk features  T2: Tumour ? 2cm; or; Tumour ? 2 cm with high-risk features  T3: Tumour with the invasion of maxilla, mandible, orbit or temporal bone  T4: Tumour with the invasion of axial or appendicular skeleton or perineural invasion of skull base    Khoa staging for cutaneous SCC  NX: Regional lymph nodes cannot be assessed  N0: No regional lymph node metastasis  N1: Metastasis in one local lymph node ? 3cm  N2: Metastasis in one local lymph node ? 3cm; or; Metastasis in >1 local lymph node ? 6cm  N3: Metastasis in lymph node ? 6cm    How is squamous cell carcinoma diagnosed?  Diagnosis of cutaneous SCC is based on clinical features. The diagnosis and histological subtype are confirmed pathologically by diagnostic biopsy or following excision. See squamous cell carcinoma - pathology.  Patients with high-risk SCC may also undergo staging investigations to determine whether it has spread to lymph nodes or elsewhere. These may include:  Imaging using ultrasound scan, X-rays, CT scans, MRI scans   Lymph node or other tissue biopsies    What is the treatment for cutaneous squamous cell  carcinoma?  Cutaneous SCC is nearly always treated surgically. Most cases are excised with a 3-10 mm margin of normal tissue around a visible tumour. A flap or skin graft may be needed to repair the defect.  Other methods of removal include:  Shave, curettage, and electrocautery for low-risk tumours on trunk and limbs   Aggressive cryotherapy for very small, thin, low-risk tumours   Mohs micrographic surgery for large facial lesions with indistinct margins or recurrent tumours   Radiotherapy for an inoperable tumour, patients unsuitable for surgery, or as adjuvant    What is the treatment for advanced or metastatic squamous cell carcinoma?  Locally advanced primary, recurrent or metastatic SCC requires multidisciplinary consultation. Often a combination of treatments is used.  Surgery   Radiotherapy   Cemiplimab   Experimental targeted therapy using epidermal growth factor receptor inhibitors    How can cutaneous squamous cell carcinoma be prevented?  There is a great deal of evidence to show that very careful sun protection at any time of life reduces the number of SCCs. This is particularly important in ageing, sun-damaged, fair skin; in patients that are immune suppressed; and in those who already have actinic keratoses or previous SCC.  Stay indoors or under the shade in the middle of the day   Wear covering clothing   Apply high protection factor SPF50+ broad-spectrum sunscreens generously to exposed skin if outdoors   Avoid indoor tanning (sun beds, solaria)    Oral nicotinamide (vitamin B3) in a dose of 500 mg twice daily may reduce the number and severity of SCCs in people at high risk.  Patients with multiple squamous cell carcinomas may be prescribed an oral retinoid (acitretin or isotretinoin). These reduce the number of tumours but have some nuisance side effects.    What is the outlook for cutaneous squamous cell carcinoma?  Most SCCs are cured by treatment. A cure is most likely if treatment is  undertaken when the lesion is small. The risk of recurrence or disease-associated death is greater for tumours that are > 20 mm in diameter and/or > 2 mm in thickness at the time of surgical excision.  About 50% of people at high risk of SCC develop a second one within 5 years of the first. They are also at increased risk of other skin cancers, especially melanoma. Regular self-skin examinations and long-term annual skin checks by an experienced health professional are recommended.

## 2024-10-30 ENCOUNTER — OFFICE VISIT (OUTPATIENT)
Age: 74
End: 2024-10-30
Payer: COMMERCIAL

## 2024-10-30 VITALS
HEIGHT: 63 IN | BODY MASS INDEX: 25.34 KG/M2 | WEIGHT: 143 LBS | TEMPERATURE: 97 F | HEART RATE: 79 BPM | SYSTOLIC BLOOD PRESSURE: 154 MMHG | OXYGEN SATURATION: 99 % | DIASTOLIC BLOOD PRESSURE: 82 MMHG

## 2024-10-30 DIAGNOSIS — D18.01 CHERRY ANGIOMA: ICD-10-CM

## 2024-10-30 DIAGNOSIS — I78.1 SPIDER TELANGIECTASIA: Primary | ICD-10-CM

## 2024-10-30 DIAGNOSIS — D22.9 MULTIPLE MELANOCYTIC NEVI: ICD-10-CM

## 2024-10-30 DIAGNOSIS — L82.1 SEBORRHEIC KERATOSIS: ICD-10-CM

## 2024-10-30 PROCEDURE — 99213 OFFICE O/P EST LOW 20 MIN: CPT | Performed by: STUDENT IN AN ORGANIZED HEALTH CARE EDUCATION/TRAINING PROGRAM

## 2024-10-30 NOTE — PROGRESS NOTES
"[facility] Dermatology Clinic Note     Patient Name: Kylie Fritz  Encounter Date: 10/30/24     Have you been cared for by a [facility] Dermatologist in the last 3 years and, if so, which description applies to you?    Yes.  I have been here within the last 3 years, and my medical history has NOT changed since that time.  I am FEMALE/of child-bearing potential.    REVIEW OF SYSTEMS:  Have you recently had or currently have any of the following? No changes in my recent health.   PAST MEDICAL HISTORY:  Have you personally ever had or currently have any of the following?  If \"YES,\" then please provide more detail. No changes in my medical history.   HISTORY OF IMMUNOSUPPRESSION: Do you have a history of any of the following:  Systemic Immunosuppression such as Diabetes, Biologic or Immunotherapy, Chemotherapy, Organ Transplantation, Bone Marrow Transplantation or Prednisone?  No     Answering \"YES\" requires the addition of the dotphrase \"IMMUNOSUPPRESSED\" as the first diagnosis of the patient's visit.   FAMILY HISTORY:  Any \"first degree relatives\" (parent, brother, sister, or child) with the following?    No changes in my family's known health.   PATIENT EXPERIENCE:    Do you want the Dermatologist to perform a COMPLETE skin exam today including a clinical examination under the \"bra and underwear\" areas?  NO  If necessary, do we have your permission to call and leave a detailed message on your Preferred Phone number that includes your specific medical information?  Yes      Allergies   Allergen Reactions    Lisinopril      Other reaction(s): Cough    Penicillins Hives    Penicillin G      Other reaction(s): Other (Please comment)  Pt was young when she had reaction, does not recall what type of reaction she had      Current Outpatient Medications:     aspirin (ECOTRIN LOW STRENGTH) 81 mg EC tablet, Take 81 mg by mouth, Disp: , Rfl:     atorvastatin (LIPITOR) 40 mg tablet, ONE PILL BY MOUTH ONCE A DAY. PLEASE CALL " FOR OFFICE VISIT., Disp: , Rfl:     buPROPion (Wellbutrin XL) 300 mg 24 hr tablet, , Disp: , Rfl:     fluticasone (FLONASE) 50 mcg/act nasal spray, fluticasone propionate 50 mcg/actuation nasal spray,suspension, Disp: , Rfl:     losartan (COZAAR) 100 MG tablet, , Disp: , Rfl:     desonide (DESOWEN) 0.05 % ointment, APPLY SPARINGLY TO AFFECTED AREA(S) TWICE DAILY, Disp: 15 g, Rfl: 1    econazole nitrate 1 % cream, econazole 1 % topical cream, Disp: , Rfl:     ibuprofen (MOTRIN) 600 mg tablet, , Disp: , Rfl:     losartan (COZAAR) 50 mg tablet, TAKE 1 TABLET BY MOUTH DAILY, Disp: , Rfl:     methocarbamol (ROBAXIN) 500 mg tablet, Take 1 tablet by mouth 4 (four) times a day as needed for muscle spasms for up to 10 days (Patient not taking: Reported on 5/7/2019), Disp: 20 tablet, Rfl: 0    naproxen (NAPROSYN) 500 mg tablet, Take 1 tablet by mouth 2 (two) times a day with meals (Patient not taking: Reported on 5/7/2019), Disp: 20 tablet, Rfl: 0    omeprazole (PriLOSEC) 20 mg delayed release capsule, , Disp: , Rfl:     pimecrolimus (ELIDEL) 1 % cream, Apply topically 2 (two) times a day To rash around mouth until resolved, Disp: 30 g, Rfl: 1    sertraline (ZOLOFT) 25 mg tablet, sertraline 25 mg tablet, Disp: , Rfl:     Sod Fluoride-Potassium Nitrate 1.1-5 % PSTE, PreviDent 5000 Sensitive 1.1 %-5 % dental paste, Disp: , Rfl:     Sod Fluoride-Potassium Nitrate 1.1-5 % PSTE, PreviDent 5000 Sensitive 1.1 %-5 % dental paste, Disp: , Rfl:           Whom besides the patient is providing clinical information about today's encounter?   NO ADDITIONAL HISTORIAN (patient alone provided history)    Physical Exam and Assessment/Plan by Diagnosis:      HISTORY OF SQUAMOUS CELL CARCINOMA IN SITU    Physical Exam:  Anatomic Location Affected:  Left lower leg  Morphological Description of Scar:  well healed scar  Suspected Recurrence: no  Regional adenopathy: no    Additional History of Present Condition:  ED&C done by Dr. Pino on  "10/24/23.    Assessment and Plan:  Based on a thorough discussion of this condition and the management approach to it (including a comprehensive discussion of the known risks, side effects and potential benefits of treatment), the patient (family) agrees to implement the following specific plan:  Skin check every 6 months    How can SCC be prevented?  The most important way to prevent SCC is to avoid sunburn. This is especially important in childhood and early life. Fair skinned individuals and those with a personal or family history of BCC should protect their skin from sun exposure daily, year-round and lifelong.  Stay indoors or under the shade in the middle of the day   Wear covering clothing   Apply high protection factor SPF50+ broad-spectrum sunscreens generously to exposed skin if outdoors   Avoid indoor tanning (sun beds, solaria)      What is the outlook for SCC?  Most SCC are cured by treatment. Cure is most likely if treatment is undertaken when the lesion is small. A small percent of SCC's can spread to lymph  nodes and long term monitoring is indicated.   They are also at increased risk of other skin cancers, especially melanoma. Regular self-skin examinations and long-term annual skin checks by an experienced health professional are recommended.     MELANOCYTIC NEVI (\"Moles\")    Physical Exam:  Anatomic Location Affected: Mostly on sun-exposed areas of the trunk and extremities  Morphological Description:  Scattered, 1-4mm round to ovoid, symmetrical-appearing, even bordered, skin colored to dark brown macules/papules, mostly in sun-exposed areas  Pertinent Positives:  Pertinent Negatives:    Additional History of Present Condition:  normal appearing moles present for years    Assessment and Plan:  Based on a thorough discussion of this condition and the management approach to it (including a comprehensive discussion of the known risks, side effects and potential benefits of treatment), the patient " "(family) agrees to implement the following specific plan:  Provided handout with information regarding the ABCDE's of moles   Recommend routine skin exams every 6 months   Sun avoidance, protective clothing (known as UPF clothing), and the use of at least SPF 30 sunscreens is advised. Sunscreen should be reapplied every two hours when outside.       SEBORRHEIC KERATOSIS; NON-INFLAMED    Physical Exam:  Anatomic Location Affected:  scattered across trunk, extremities,  face  Morphological Description:  Flat and raised, waxy, smooth to warty textured, yellow to brownish-grey to dark brown to blackish, discrete, \"stuck-on\" appearing papules.  Pertinent Positives:  Pertinent Negatives:    Additional History of Present Condition:  Patient reports new bumps on the skin.  Denies itch, burn, pain, bleeding or ulceration.  Present constantly; nothing seems to make it worse or better.  No prior treatment.      Assessment and Plan:  Based on a thorough discussion of this condition and the management approach to it (including a comprehensive discussion of the known risks, side effects and potential benefits of treatment), the patient (family) agrees to implement the following specific plan:  Reassured benign        ANGIOMA (\"CHERRY ANGIOMA\")    Physical Exam:  Anatomic Location: scattered across sun exposed areas of the trunk and extremities   Morphologic Description: Firm red to reddish-blue discrete papules  Pertinent Positives:  Pertinent Negatives:    Additional History of Present Condition:  Present on exam.    Assessment and Plan:  Reassured benign        Scribe Attestation      I,:  Mary Alice Egan am acting as a scribe while in the presence of the attending physician.:       I,:  Ray Anders DO personally performed the services described in this documentation    as scribed in my presence.:             "

## 2025-06-09 ENCOUNTER — TELEPHONE (OUTPATIENT)
Age: 75
End: 2025-06-09

## 2025-06-09 NOTE — TELEPHONE ENCOUNTER
Patient called asking if she can be put back on the schedule for tomorrow 6/10/25 with Dr Anders because she called earlier to cancel but she is able to come.    The apt is still available.    Sent a teams to [facility] and a secured chat to office to see if it's ok to put patient back on the schedule for her original apt /no reply within the given time to keep patient on hold and asked if for any reason patient can't be seen to let me know so Ic an call her back to reschedule.

## 2025-06-10 ENCOUNTER — OFFICE VISIT (OUTPATIENT)
Age: 75
End: 2025-06-10
Payer: COMMERCIAL

## 2025-06-10 VITALS
BODY MASS INDEX: 25.34 KG/M2 | DIASTOLIC BLOOD PRESSURE: 70 MMHG | HEIGHT: 63 IN | OXYGEN SATURATION: 98 % | TEMPERATURE: 98 F | SYSTOLIC BLOOD PRESSURE: 124 MMHG | HEART RATE: 86 BPM | WEIGHT: 143 LBS

## 2025-06-10 DIAGNOSIS — D18.01 CHERRY ANGIOMA: ICD-10-CM

## 2025-06-10 DIAGNOSIS — Z85.828 HISTORY OF SKIN CANCER: ICD-10-CM

## 2025-06-10 DIAGNOSIS — L82.1 SEBORRHEIC KERATOSIS: Primary | ICD-10-CM

## 2025-06-10 DIAGNOSIS — D22.9 MULTIPLE MELANOCYTIC NEVI: ICD-10-CM

## 2025-06-10 PROCEDURE — 99213 OFFICE O/P EST LOW 20 MIN: CPT | Performed by: STUDENT IN AN ORGANIZED HEALTH CARE EDUCATION/TRAINING PROGRAM

## 2025-06-10 NOTE — PROGRESS NOTES
"[facility] Dermatology Clinic Note     Patient Name: Kylie Fritz  Encounter Date: Amalia 10,2025       Have you been cared for by a [facility] Dermatologist in the last 3 years and, if so, which description applies to you? Yes. I have been here within the last 3 years, and my medical history has NOT changed since that time. I am of child-bearing potential.     REVIEW OF SYSTEMS:  Have you recently had or currently have any of the following? No changes in my recent health.   PAST MEDICAL HISTORY:  Have you personally ever had or currently have any of the following?  If \"YES,\" then please provide more detail. No changes in my medical history.   HISTORY OF IMMUNOSUPPRESSION: Do you have a history of any of the following:  Systemic Immunosuppression such as Diabetes, Biologic or Immunotherapy, Chemotherapy, Organ Transplantation, Bone Marrow Transplantation or Prednisone?  No     Answering \"YES\" requires the addition of the dotphrase \"IMMUNOSUPPRESSED\" as the first diagnosis of the patient's visit.   FAMILY HISTORY:  Any \"first degree relatives\" (parent, brother, sister, or child) with the following?    No changes in my family's known health.   PATIENT EXPERIENCE:    Do you want the Dermatologist to perform a COMPLETE skin exam today including a clinical examination under the \"bra and underwear\" areas?  Yes  If necessary, do we have your permission to call and leave a detailed message on your Preferred Phone number that includes your specific medical information?  Yes      Allergies[1] Current Medications[2]              Whom besides the patient is providing clinical information about today's encounter?   NO ADDITIONAL HISTORIAN (patient alone provided history)    Physical Exam and Assessment/Plan by Diagnosis:    HISTORY OF SQUAMOUS CELL CARCINOMA      Physical Exam:  Anatomic Location Affected:  left lower leg 09/07/2023  Morphological Description of Scar:  well healed   Suspected Recurrence: no  Regional " "adenopathy: no     Additional History of Present Condition:  present on exam      Assessment and Plan:  Based on a thorough discussion of this condition and the management approach to it (including a comprehensive discussion of the known risks, side effects and potential benefits of treatment), the patient (family) agrees to implement the following specific plan:  Continue to monitor with scheduled skin exams        MELANOCYTIC NEVI (\"Moles\")    Physical Exam:  Anatomic Location Affected: Mostly on sun-exposed areas of the trunk arms   Morphological Description:  Scattered, 1-4mm round to ovoid, symmetrical-appearing, even bordered, skin colored to dark brown macules/papules, mostly in sun-exposed areas  Pertinent Positives:  Pertinent Negatives:    Additional History of Present Condition:  present at exam ,reassured benign    Assessment and Plan:  Based on a thorough discussion of this condition and the management approach to it (including a comprehensive discussion of the known risks, side effects and potential benefits of treatment), the patient (family) agrees to implement the following specific plan:  Provided handout with information regarding the ABCDE's of moles   Recommend routine skin exams every year   Sun avoidance, protective clothing (known as UPF clothing), and the use of at least SPF 30 sunscreens is advised. Sunscreen should be reapplied every two hours when outside.       SEBORRHEIC KERATOSIS; NON-INFLAMED    Physical Exam:  Anatomic Location Affected:  scattered across trunk, extremities,  face  Morphological Description:  Flat and raised, waxy, smooth to warty textured, yellow to brownish-grey to dark brown to blackish, discrete, \"stuck-on\" appearing papules.  Pertinent Positives:  Pertinent Negatives:    Additional History of Present Condition:  Patient reports new bumps on the skin.  Denies itch, burn, pain, bleeding or ulceration.  Present constantly; nothing seems to make it worse or better.  " "No prior treatment.      Assessment and Plan:  Based on a thorough discussion of this condition and the management approach to it (including a comprehensive discussion of the known risks, side effects and potential benefits of treatment), the patient (family) agrees to implement the following specific plan:  Reassured benign        ANGIOMA (\"CHERRY ANGIOMA\")    Physical Exam:  Anatomic Location: scattered across sun exposed areas of the trunk and extremities   Morphologic Description: Firm red to reddish-blue discrete papules  Pertinent Positives:  Pertinent Negatives:    Additional History of Present Condition:  Present on exam.    Assessment and Plan:  Reassured benign      Scribe Attestation    I,:  Afshan Holguin MA am acting as a scribe while in the presence of the attending physician.:       I,:  Ray Anders DO personally performed the services described in this documentation    as scribed in my presence.:                [1]  Allergies  Allergen Reactions   • Lisinopril      Other reaction(s): Cough   • Penicillins Hives   • Penicillin G      Other reaction(s): Other (Please comment)  Pt was young when she had reaction, does not recall what type of reaction she had   [2]    Current Outpatient Medications:   •  aspirin (ECOTRIN LOW STRENGTH) 81 mg EC tablet, Take 81 mg by mouth, Disp: , Rfl:   •  atorvastatin (LIPITOR) 40 mg tablet, , Disp: , Rfl:   •  buPROPion (Wellbutrin XL) 300 mg 24 hr tablet, , Disp: , Rfl:   •  fluticasone (FLONASE) 50 mcg/act nasal spray, , Disp: , Rfl:   •  losartan (COZAAR) 100 MG tablet, , Disp: , Rfl:   •  sertraline (ZOLOFT) 25 mg tablet, , Disp: , Rfl:   •  desonide (DESOWEN) 0.05 % ointment, APPLY SPARINGLY TO AFFECTED AREA(S) TWICE DAILY, Disp: 15 g, Rfl: 1  •  econazole nitrate 1 % cream, econazole 1 % topical cream, Disp: , Rfl:   •  ibuprofen (MOTRIN) 600 mg tablet, , Disp: , Rfl:   •  losartan (COZAAR) 50 mg tablet, TAKE 1 TABLET BY MOUTH DAILY, Disp: , Rfl:   •  " methocarbamol (ROBAXIN) 500 mg tablet, Take 1 tablet by mouth 4 (four) times a day as needed for muscle spasms for up to 10 days (Patient not taking: Reported on 5/7/2019), Disp: 20 tablet, Rfl: 0  •  naproxen (NAPROSYN) 500 mg tablet, Take 1 tablet by mouth 2 (two) times a day with meals (Patient not taking: Reported on 5/7/2019), Disp: 20 tablet, Rfl: 0  •  omeprazole (PriLOSEC) 20 mg delayed release capsule, , Disp: , Rfl:   •  pimecrolimus (ELIDEL) 1 % cream, Apply topically 2 (two) times a day To rash around mouth until resolved, Disp: 30 g, Rfl: 1  •  Sod Fluoride-Potassium Nitrate 1.1-5 % PSTE, PreviDent 5000 Sensitive 1.1 %-5 % dental paste, Disp: , Rfl:   •  Sod Fluoride-Potassium Nitrate 1.1-5 % PSTE, PreviDent 5000 Sensitive 1.1 %-5 % dental paste, Disp: , Rfl:

## 2025-06-10 NOTE — PATIENT INSTRUCTIONS
"MELANOCYTIC NEVI (\"Moles\")    Melanocytic nevi (\"moles\") are tan or brown, raised or flat areas of the skin which have an increased number of melanocytes. Melanocytes are the cells in our body which make pigment and account for skin color.    Some moles are present at birth (I.e., \"congenital nevi\"), while others come up later in life (i.e., \"acquired nevi\").  The sun can stimulate the body to make more moles.  Sunburns are not the only thing that triggers more moles.  Chronic sun exposure can do it too.     Clinically distinguishing a healthy mole from melanoma may be difficult, even for experienced dermatologists. The \"ABCDE's\" of moles have been suggested as a means of helping to alert a person to a suspicious mole and the possible increased risk of melanoma.  The suggestions for raising alert are as follows:    Asymmetry: Healthy moles tend to be symmetric, while melanomas are often asymmetric.  Asymmetry means if you draw a line through the mole, the two halves do not match in color, size, shape, or surface texture. Asymmetry can be a result of rapid enlargement of a mole, the development of a raised area on a previously flat lesion, scaling, ulceration, bleeding or scabbing within the mole.  Any mole that starts to demonstrate \"asymmetry\" should be examined promptly by a board certified dermatologist.     Border: Healthy moles tend to have discrete, even borders.  The border of a melanoma often blends into the normal skin and does not sharply delineate the mole from normal skin.  Any mole that starts to demonstrate \"uneven borders\" should be examined promptly by a board certified dermatologist.     Color: Healthy moles tend to be one color throughout.  Melanomas tend to be made up of different colors ranging from dark black, blue, white, or red.  Any mole that demonstrates a color change should be examined promptly by a board certified dermatologist.     Diameter: Healthy moles tend to be smaller than 0.6 cm " "in size; an exception are \"congenital nevi\" that can be larger.  Melanomas tend to grow and can often be greater than 0.6 cm (1/4 of an inch, or the size of a pencil eraser). This is only a guideline, and many normal moles may be larger than 0.6 cm without being unhealthy.  Any mole that starts to change in size (small to bigger or bigger to smaller) should be examined promptly by a board certified dermatologist.     Evolving: Healthy moles tend to \"stay the same.\"  Melanomas may often show signs of change or evolution such as a change in size, shape, color, or elevation.  Any mole that starts to itch, bleed, crust, burn, hurt, or ulcerate or demonstrate a change or evolution should be examined promptly by a board certified dermatologist.      Dysplastic Nevi  Dysplastic moles are moles that fit the ABCDE rules of melanoma but are not identified as melanomas when examined under the microscope.  They may indicate an increased risk of melanoma in that person. If there is a family history of melanoma, most experts agree that the person may be at an increased risk for developing a melanoma.  Experts still do not agree on what dysplastic moles mean in patients without a personal or family history of melanoma.  Dysplastic moles are usually larger than common moles and have different colors within it with irregular borders. The appearance can be very similar to a melanoma. Biopsies of dysplastic moles may show abnormalities which are different from a regular mole.      Melanoma  Malignant melanoma is a type of skin cancer that can be deadly if it spreads throughout the body. The incidence of melanoma in the [place] is growing faster than any other cancer. Melanoma usually grows near the surface of the skin for a period of time, and then begins to grow deeper into the skin. Once it grows deeper into the skin, the risk of spread to other organs greatly increases. Therefore, early detection and removal of a malignant " "melanoma may result in a better chance at a complete cure; removal after the tumor has spread may not be as effective, leading to worse clinical outcomes such as death.    The true rate of nevus transformation into a melanoma is unknown. It has been estimated that the lifetime risk for any acquired melanocytic nevus on any 20-year-old individual transforming into melanoma by age 80 is 0.03% (1 in 3,164) for men and 0.009% (1 in 10,800) for women.     The appearance of a \"new mole\" remains one of the most reliable methods for identifying a malignant melanoma.  Occasionally, melanomas appear as rapidly growing, blue-black, dome-shaped bumps within a previous mole or previous area of normal skin.  Other times, melanomas are suspected when a mole suddenly appears or changes. Itching, burning, or pain in a pigmented lesion should increase suspicion, but most patients with early melanoma have no skin discomfort whatsoever.  Melanoma can occur anywhere on the skin, including areas that are difficult for self-examination. Many melanomas are first noticed by other family members.  Suspicious-looking moles may be removed for microscopic examination.       You may be able to prevent death from melanoma by doing two simple things:    Try to avoid unnecessary sun exposure and protect your skin when it is exposed to the sun.  People who live near the equator, people who have intermittent exposures to large amounts of sun, and people who have had sunburns in childhood or adolescence have an increased risk for melanoma. Sun sense and vigilant sun protection may be keys to helping to prevent melanoma.  We recommend wearing UPF-rated sun protective clothing and sunglasses whenever possible and applying a moisturizer-sunscreen combination product (SPF 50+) such as Neutrogena Daily Defense to sun exposed areas of skin at least three times a day.    Have your moles regularly examined by a board certified dermatologist AND by yourself or " "a family member/friend at home.  We recommend that you have your moles examined at least once a year by a board certified dermatologist.  Use your birthday as an annual reminder to have your \"Birthday Suit\" (I.e., your skin) examined; it is a nice birthday gift to yourself to know that your skin is healthy appearing!  Additionally, at-home self examinations may be helpful for detecting a possible melanoma.  Use the ABCDEs we discussed and check your moles once a month at home.        SEBORRHEIC KERATOSIS  A seborrheic keratosis is a harmless warty spot that appears during adult life as a common sign of skin aging.  Seborrheic keratoses can arise on any area of skin, covered or uncovered, with the usual exception of the palms and soles. They do not arise from mucous membranes. Seborrheic keratoses can have highly variable appearance.      Seborrheic keratoses are extremely common. It has been estimated that over 90% of adults over the age of 60 years have one or more of them. They occur in males and females of all races, typically beginning to erupt in the 30s or 40s. They are uncommon under the age of 20 years.  The precise cause of seborrhoeic keratoses is not known.  Seborrhoeic keratoses are considered degenerative in nature. As time goes by, seborrheic keratoses tend to become more numerous. Some people inherit a tendency to develop a very large number of them; some people may have hundreds of them.    The name \"seborrheic keratosis\" is misleading, because these lesions are not limited to a seborrhoeic distribution (scalp, mid-face, chest, upper back), nor are they formed from sebaceous glands, nor are they associated with sebum -- which is greasy.  Seborrheic keratosis may also be called \"SK,\" \"Seb K,\" \"basal cell papilloma,\" \"senile wart,\" or \"barnacle.\"      There is no easy way to remove multiple lesions on a single occasion.  Unless a specific lesion is \"inflamed\" and is causing pain or stinging/burning or " "is bleeding, most insurance companies do not authorize treatment.      ANGIOMA (\"CHERRY ANGIOMA\")  [place] angiomas markedly increase in number from about the age of 40, so it has been estimated that 75% of people over 75 years of age have them. Although they also called \"senile angiomas,\" they can occur in young people too - 5% of adolescents have been found to have them.     Cherry angiomas are very common in males and females of any age or race, with no difference in sexes or races affected. They are however more noticeable in white skin than in skin of colour.  There may be a family history of similar lesions. Eruptive (very large number appearing in a short period of time) cherry angiomas have been rarely reported to be associated with internal malignancy and pregnancy.  "

## 2025-08-04 ENCOUNTER — OFFICE VISIT (OUTPATIENT)
Age: 75
End: 2025-08-04
Payer: COMMERCIAL

## 2025-08-04 PROCEDURE — 88305 TISSUE EXAM BY PATHOLOGIST: CPT | Performed by: PATHOLOGY

## 2025-08-11 ENCOUNTER — TELEPHONE (OUTPATIENT)
Age: 75
End: 2025-08-11

## 2025-08-15 ENCOUNTER — PROCEDURE VISIT (OUTPATIENT)
Dept: DERMATOLOGY | Facility: CLINIC | Age: 75
End: 2025-08-15
Payer: COMMERCIAL

## 2025-08-15 PROCEDURE — 88342 IMHCHEM/IMCYTCHM 1ST ANTB: CPT | Performed by: STUDENT IN AN ORGANIZED HEALTH CARE EDUCATION/TRAINING PROGRAM

## 2025-08-15 PROCEDURE — 88341 IMHCHEM/IMCYTCHM EA ADD ANTB: CPT | Performed by: STUDENT IN AN ORGANIZED HEALTH CARE EDUCATION/TRAINING PROGRAM

## 2025-08-15 PROCEDURE — 88305 TISSUE EXAM BY PATHOLOGIST: CPT | Performed by: STUDENT IN AN ORGANIZED HEALTH CARE EDUCATION/TRAINING PROGRAM
